# Patient Record
Sex: FEMALE | Race: WHITE | Employment: OTHER | URBAN - METROPOLITAN AREA
[De-identification: names, ages, dates, MRNs, and addresses within clinical notes are randomized per-mention and may not be internally consistent; named-entity substitution may affect disease eponyms.]

---

## 2017-01-09 PROBLEM — D70.9 NEUTROPENIC FEVER (HCC): Status: ACTIVE | Noted: 2017-01-09

## 2017-01-09 PROBLEM — R50.81 NEUTROPENIC FEVER (HCC): Status: ACTIVE | Noted: 2017-01-09

## 2017-04-25 ENCOUNTER — HOSPITAL ENCOUNTER (OUTPATIENT)
Dept: OTHER | Age: 77
Discharge: HOME | End: 2017-04-25
Attending: ORTHOPAEDIC SURGERY | Admitting: ORTHOPAEDIC SURGERY

## 2017-04-25 LAB
ALBUMIN SERPL-MCNC: 3.6 G/DL (ref 3.4–5)
ANION GAP SERPL CALCULATED.3IONS-SCNC: 15 MMOL/L (ref 3–16)
APTT: 27.5 SEC (ref 21–31.8)
BASOPHILS ABSOLUTE: 0 K/UL (ref 0–0.2)
BASOPHILS RELATIVE PERCENT: 0.7 %
BILIRUBIN URINE: NEGATIVE
BLOOD, URINE: NEGATIVE
BUN BLDV-MCNC: 11 MG/DL (ref 7–20)
CALCIUM SERPL-MCNC: 8.9 MG/DL (ref 8.3–10.6)
CHLORIDE BLD-SCNC: 100 MMOL/L (ref 99–110)
CLARITY: CLEAR
CO2: 25 MMOL/L (ref 21–32)
COLOR: YELLOW
CREAT SERPL-MCNC: 0.9 MG/DL (ref 0.6–1.2)
EOSINOPHILS ABSOLUTE: 1.2 K/UL (ref 0–0.6)
EOSINOPHILS RELATIVE PERCENT: 26.3 %
EPITHELIAL CELLS, UA: ABNORMAL /HPF
GFR AFRICAN AMERICAN: >60
GFR NON-AFRICAN AMERICAN: >60
GLUCOSE BLD-MCNC: 99 MG/DL (ref 70–99)
GLUCOSE URINE: NEGATIVE MG/DL
HCT VFR BLD CALC: 37.8 % (ref 36–48)
HEMOGLOBIN: 12.5 G/DL (ref 12–16)
INR BLD: 1.1 (ref 0.85–1.15)
KETONES, URINE: NEGATIVE MG/DL
LEUKOCYTE ESTERASE, URINE: ABNORMAL
LYMPHOCYTES ABSOLUTE: 0.8 K/UL (ref 1–5.1)
LYMPHOCYTES RELATIVE PERCENT: 16.4 %
MCH RBC QN AUTO: 33.5 PG (ref 26–34)
MCHC RBC AUTO-ENTMCNC: 33 G/DL (ref 31–36)
MCV RBC AUTO: 101.4 FL (ref 80–100)
MICROSCOPIC EXAMINATION: YES
MONOCYTES ABSOLUTE: 0.4 K/UL (ref 0–1.3)
MONOCYTES RELATIVE PERCENT: 8.6 %
NEUTROPHILS ABSOLUTE: 2.2 K/UL (ref 1.7–7.7)
NEUTROPHILS RELATIVE PERCENT: 48 %
NITRITE, URINE: NEGATIVE
PDW BLD-RTO: 13.6 % (ref 12.4–15.4)
PH UA: 6.5
PLATELET # BLD: 83 K/UL (ref 135–450)
PMV BLD AUTO: 10.2 FL (ref 5–10.5)
POTASSIUM SERPL-SCNC: 4.2 MMOL/L (ref 3.5–5.1)
PROTEIN UA: NEGATIVE MG/DL
PROTHROMBIN TIME: 12.4 SEC (ref 9.6–13)
RBC # BLD: 3.72 M/UL (ref 4–5.2)
RBC UA: ABNORMAL /HPF (ref 0–2)
SODIUM BLD-SCNC: 140 MMOL/L (ref 136–145)
SPECIFIC GRAVITY UA: 1.02
URINE TYPE: ABNORMAL
UROBILINOGEN, URINE: 0.2 E.U./DL
WBC # BLD: 4.7 K/UL (ref 4–11)
WBC UA: ABNORMAL /HPF (ref 0–5)

## 2017-04-26 LAB
ESTIMATED AVERAGE GLUCOSE: 108.3 MG/DL
HBA1C MFR BLD: 5.4 %
URINE CULTURE, ROUTINE: NORMAL

## 2017-05-11 PROBLEM — I10 HTN (HYPERTENSION): Status: ACTIVE | Noted: 2017-05-11

## 2017-05-11 PROBLEM — M25.551 RIGHT HIP PAIN: Status: ACTIVE | Noted: 2017-05-11

## 2017-05-11 PROBLEM — D69.6 THROMBOCYTOPENIA (HCC): Status: ACTIVE | Noted: 2017-05-11

## 2017-05-11 PROBLEM — E78.5 HLD (HYPERLIPIDEMIA): Status: ACTIVE | Noted: 2017-05-11

## 2017-05-11 PROBLEM — E11.9 DM2 (DIABETES MELLITUS, TYPE 2) (HCC): Status: ACTIVE | Noted: 2017-05-11

## 2017-05-12 PROBLEM — E66.9 OBESITY: Status: ACTIVE | Noted: 2017-05-12

## 2017-05-25 DIAGNOSIS — M25.551 RIGHT HIP PAIN: Primary | ICD-10-CM

## 2017-05-25 PROCEDURE — 73502 X-RAY EXAM HIP UNI 2-3 VIEWS: CPT | Performed by: ORTHOPAEDIC SURGERY

## 2017-06-01 ENCOUNTER — HOSPITAL ENCOUNTER (OUTPATIENT)
Dept: CT IMAGING | Age: 77
Discharge: HOME | End: 2017-06-01
Attending: NURSE PRACTITIONER | Admitting: NURSE PRACTITIONER

## 2017-06-01 DIAGNOSIS — C83.35 DIFFUSE LARGE B-CELL LYMPHOMA OF LYMPH NODES OF LOWER EXTREMITY (HCC): ICD-10-CM

## 2017-06-01 DIAGNOSIS — Z09 CHEMOTHERAPY FOLLOW-UP EXAMINATION: ICD-10-CM

## 2017-06-01 DIAGNOSIS — Z09 ENCOUNTER FOR FOLLOW-UP EXAMINATION AFTER COMPLETED TREATMENT FOR CONDITIONS OTHER THAN MALIGNANT NEOPLASM: ICD-10-CM

## 2017-06-22 DIAGNOSIS — M25.551 RIGHT HIP PAIN: Primary | ICD-10-CM

## 2017-06-22 PROCEDURE — 73502 X-RAY EXAM HIP UNI 2-3 VIEWS: CPT | Performed by: ORTHOPAEDIC SURGERY

## 2017-08-03 DIAGNOSIS — M25.551 RIGHT HIP PAIN: Primary | ICD-10-CM

## 2017-08-03 PROCEDURE — 73502 X-RAY EXAM HIP UNI 2-3 VIEWS: CPT | Performed by: ORTHOPAEDIC SURGERY

## 2017-11-09 DIAGNOSIS — M25.551 RIGHT HIP PAIN: Primary | ICD-10-CM

## 2018-05-17 DIAGNOSIS — R52 PAIN: Primary | ICD-10-CM

## 2018-05-17 DIAGNOSIS — M89.8X5 PAIN OF LEFT FEMUR: Primary | ICD-10-CM

## 2018-06-29 ENCOUNTER — OFFICE VISIT (OUTPATIENT)
Dept: ORTHOPEDIC SURGERY | Age: 78
End: 2018-06-29

## 2018-06-29 DIAGNOSIS — M54.16 LUMBAR RADICULOPATHY: Primary | ICD-10-CM

## 2018-06-29 DIAGNOSIS — G60.9 IDIOPATHIC PERIPHERAL NEUROPATHY: ICD-10-CM

## 2018-06-29 DIAGNOSIS — M54.31 BILATERAL SCIATICA: ICD-10-CM

## 2018-06-29 DIAGNOSIS — M54.32 BILATERAL SCIATICA: ICD-10-CM

## 2018-06-29 PROCEDURE — 95886 MUSC TEST DONE W/N TEST COMP: CPT | Performed by: PHYSICAL MEDICINE & REHABILITATION

## 2018-06-29 PROCEDURE — 95910 NRV CNDJ TEST 7-8 STUDIES: CPT | Performed by: PHYSICAL MEDICINE & REHABILITATION

## 2018-08-24 ENCOUNTER — OFFICE VISIT (OUTPATIENT)
Dept: ORTHOPEDIC SURGERY | Age: 78
End: 2018-08-24

## 2018-08-24 VITALS
HEART RATE: 62 BPM | BODY MASS INDEX: 29.89 KG/M2 | HEIGHT: 66 IN | WEIGHT: 186 LBS | SYSTOLIC BLOOD PRESSURE: 102 MMHG | DIASTOLIC BLOOD PRESSURE: 55 MMHG

## 2018-08-24 DIAGNOSIS — M54.16 LUMBAR RADICULOPATHY: Primary | ICD-10-CM

## 2018-08-24 PROCEDURE — 1101F PT FALLS ASSESS-DOCD LE1/YR: CPT | Performed by: PHYSICAL MEDICINE & REHABILITATION

## 2018-08-24 PROCEDURE — G8417 CALC BMI ABV UP PARAM F/U: HCPCS | Performed by: PHYSICAL MEDICINE & REHABILITATION

## 2018-08-24 PROCEDURE — 99214 OFFICE O/P EST MOD 30 MIN: CPT | Performed by: PHYSICAL MEDICINE & REHABILITATION

## 2018-08-24 PROCEDURE — 1123F ACP DISCUSS/DSCN MKR DOCD: CPT | Performed by: PHYSICAL MEDICINE & REHABILITATION

## 2018-08-24 PROCEDURE — 1036F TOBACCO NON-USER: CPT | Performed by: PHYSICAL MEDICINE & REHABILITATION

## 2018-08-24 PROCEDURE — G8400 PT W/DXA NO RESULTS DOC: HCPCS | Performed by: PHYSICAL MEDICINE & REHABILITATION

## 2018-08-24 PROCEDURE — 1090F PRES/ABSN URINE INCON ASSESS: CPT | Performed by: PHYSICAL MEDICINE & REHABILITATION

## 2018-08-24 PROCEDURE — 4040F PNEUMOC VAC/ADMIN/RCVD: CPT | Performed by: PHYSICAL MEDICINE & REHABILITATION

## 2018-08-24 PROCEDURE — G8427 DOCREV CUR MEDS BY ELIG CLIN: HCPCS | Performed by: PHYSICAL MEDICINE & REHABILITATION

## 2018-08-24 NOTE — PROGRESS NOTES
· Range of Motion:  Flex to 30° extend to 20°  · Strength:   Strength testing is 5/5 in all muscle groups tested. Except 1/5 right ankle dorsiflexion  · Special Tests:   Straight leg raise and crossed SLR negative. Leg length and pelvis level.  0 out of 5 Doug's signs. · Skin: There are no rashes, ulcerations or lesions. · Reflexes: Reflexes are symmetrically trace at the patellar and ankle tendons. Clonus absent bilaterally at the feet. · Gait & station: Normal gait  · Additional Examinations:   · RIGHT LOWER EXTREMITY: Inspection/examination of the right lower extremity does not show any tenderness, deformity or injury. Range of motion is full. There is no gross instability. There are no rashes, ulcerations or lesions. Strength and tone are normal.  ·   · LEFT LOWER EXTREMITY:  Inspection/examination of the left lower extremity does not show any tenderness, deformity or injury. Range of motion is full. There is no gross instability. There are no rashes, ulcerations or lesions.   Strength and tone are normal.    Diagnostic Testin/28/2017  5:48 PM - Shar, [facility] Incoming Lab Results From [facility]     Component Results     Component Value Ref Range & Units Status Collected Lab   Alb 3.8  3.5 - 5.0 g/dL Final 2017  2:07    Alk Phosphatase 159   38 - 126 U/L Final 2017  2:07    ALT 61   9 - 52 U/L Final 2017  2:07    AST 54   14 - 36 U/L Final 2017  2:07    Total Bilirubin 0.9  0.2 - 1.3 mg/dL Final 2017  2:07    BUN 16  7 - 17 mg/dL Final 2017  2:07    BUN/Creatinine Ratio 20.8  <25.0 Final 2017  2:07    Calcium 9.2  8.4 - 10.2 mg/dL Final 2017  2:07    Chloride 100  98 - 107 mmol/L Final 2017  2:07    CREATININE 0.77  0.52 - 1.04 mg/dL Final 2017  2:07    GFR Non- >60.0  >60.0 mL/min/1.73m Final 2017  2:07    eGFR  >60.0  >60.0

## 2018-11-13 DIAGNOSIS — M79.604 RIGHT LEG PAIN: Primary | ICD-10-CM

## 2019-06-03 ENCOUNTER — OFFICE VISIT (OUTPATIENT)
Dept: ORTHOPEDIC SURGERY | Age: 79
End: 2019-06-03
Payer: MEDICARE

## 2019-06-03 VITALS
WEIGHT: 186.07 LBS | SYSTOLIC BLOOD PRESSURE: 114 MMHG | HEART RATE: 74 BPM | BODY MASS INDEX: 29.9 KG/M2 | HEIGHT: 66 IN | DIASTOLIC BLOOD PRESSURE: 57 MMHG

## 2019-06-03 DIAGNOSIS — R52 PAIN: Primary | ICD-10-CM

## 2019-06-03 DIAGNOSIS — M70.62 GREATER TROCHANTERIC BURSITIS OF LEFT HIP: ICD-10-CM

## 2019-06-03 DIAGNOSIS — M17.12 PRIMARY OSTEOARTHRITIS OF LEFT KNEE: ICD-10-CM

## 2019-06-03 PROCEDURE — G8427 DOCREV CUR MEDS BY ELIG CLIN: HCPCS | Performed by: ORTHOPAEDIC SURGERY

## 2019-06-03 PROCEDURE — 1090F PRES/ABSN URINE INCON ASSESS: CPT | Performed by: ORTHOPAEDIC SURGERY

## 2019-06-03 PROCEDURE — 20611 DRAIN/INJ JOINT/BURSA W/US: CPT | Performed by: ORTHOPAEDIC SURGERY

## 2019-06-03 PROCEDURE — 1123F ACP DISCUSS/DSCN MKR DOCD: CPT | Performed by: ORTHOPAEDIC SURGERY

## 2019-06-03 PROCEDURE — 99203 OFFICE O/P NEW LOW 30 MIN: CPT | Performed by: ORTHOPAEDIC SURGERY

## 2019-06-03 PROCEDURE — 1036F TOBACCO NON-USER: CPT | Performed by: ORTHOPAEDIC SURGERY

## 2019-06-03 PROCEDURE — G8417 CALC BMI ABV UP PARAM F/U: HCPCS | Performed by: ORTHOPAEDIC SURGERY

## 2019-06-03 PROCEDURE — 4040F PNEUMOC VAC/ADMIN/RCVD: CPT | Performed by: ORTHOPAEDIC SURGERY

## 2019-06-03 PROCEDURE — G8400 PT W/DXA NO RESULTS DOC: HCPCS | Performed by: ORTHOPAEDIC SURGERY

## 2019-06-03 NOTE — PROGRESS NOTES
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    History of blood transfusion 01/2017    2 UNITS    Hyperlipidemia     Hypertension     Melanoma ([address]     right face    RAY (nonalcoholic steatohepatitis)     Venous insufficiency     RLE      Past Surgical History:     Past Surgical History:   Procedure Laterality Date    COLONOSCOPY      ENDOSCOPY, COLON, DIAGNOSTIC      EYE SURGERY Bilateral     TEAR DUCT RE-ROUTED    HIP SURGERY Right 05/11/2017    R total hip revision     HYSTERECTOMY      JESSICA BSO    INCONTINENCE SURGERY      JOINT REPLACEMENT Bilateral     hips, RIGHT X2, LEFT X1    KNEE ARTHROSCOPY Left     LEEP  1996    LIVER BIOPSY      NASAL SINUS SURGERY      OTHER SURGICAL HISTORY Left 09/13/2016    biopsy left ankle mass    OTHER SURGICAL HISTORY Left 10/05/2016    PORT-A-CATH PLACEMENT                     REVISION TOTAL HIP ARTHROPLASTY Right     SKIN BIOPSY      melanoma    TOTAL HIP ARTHROPLASTY Right     TOTAL HIP ARTHROPLASTY Left      Current Medications:     Current Outpatient Medications:     b complex vitamins capsule, Take 1 capsule by mouth daily, Disp: , Rfl:     lisinopril-hydrochlorothiazide (PRINZIDE;ZESTORETIC) 10-12.5 MG per tablet, Take 1 tablet by mouth daily, Disp: , Rfl:     calcium-vitamin D (OSCAL-500) 500-200 MG-UNIT per tablet, Take 1 tablet by mouth daily, Disp: , Rfl:     Multiple Vitamins-Minerals (THERAPEUTIC MULTIVITAMIN-MINERALS) tablet, Take 1 tablet by mouth daily, Disp: , Rfl:     ascorbic acid (VITAMIN C) 500 MG tablet, Take 500 mg by mouth daily, Disp: , Rfl:   Allergies: Other  Social History:    reports that she has never smoked. She has never used smokeless tobacco. She reports that she does not drink alcohol or use drugs. Family History:   Family History   Problem Relation Age of Onset   Benita Hodgson Cancer Mother 62        leukemia    Tuberculosis Father     Cancer Brother 72        lung    Cancer Brother 36        lung    Cancer Sister 36        Breast    Cancer Brother 62        prostate    Cancer Brother         lung    Cancer Brother         Prostate Cancet       REVIEW OF SYSTEMS:   For new problems, a full review of systems will be found scanned in the patient's chart. CONSTITUTIONAL: Denies unexplained weight loss, fevers, chills   NEUROLOGICAL: Denies unsteady gait or progressive weakness  SKIN: Denies skin changes, delayed healing, rash, itching       PHYSICAL EXAM:    Vitals: Blood pressure (!) 114/57, pulse 74, height 5' 5.98\" (1.676 m), weight 186 lb 1.1 oz (84.4 kg). GENERAL EXAM:  · General Apparence: Patient is adequately groomed with no evidence of malnutrition. · Orientation: The patient is oriented to time, place and person. · Mood & Affect:The patient's mood and affect are appropriate       LEFT hip and LEFT knee PHYSICAL EXAMINATION:  · Inspection:  Trace swelling of the LEFT knee, well-healed arthroscopy portals consistent with surgical history. She has some lower extremity swelling at baseline.   Well-healed surgical incision involving the LEFT hip    · Palpation:  Knee is tender through the medial aspect in particular, LEFT hip is tender at the lateral aspect over the greater trochanter, no groin tenderness      · Range of Motion: Range of motion today at the knee is 0-110°, Gen. internal and external rotation of the LEFT hip is tolerated today no groin pain    · Strength: Hip flexor hip abductor and adductor are intact, extensor mechanism intact at the knee    · Special Tests:  Ligamentously stable regarding the LEFT knee            · Skin:  There are no rashes, ulcerations or lesions. · There are no distal  dysvascular changes     Gait & station: She ambulates today with a slightly antalgic gait pattern      Additional Examinations:              Diagnostic Testing: The following x rays were read and interpreted by myself      1.  3 x-ray views of the LEFT knee +2 x-ray views of the LEFT hip were obtained today. The patient has significant tricompartmental osteoarthritis of the LEFT knee near bone-on-bone medial compartment. No evidence of acute abnormalities. 2x-rays Left hip demonstrate bilateral total hip replacements, mild eccentric wear of the LEFT hip but prosthesis appears stable  Hip replacements are both S-ROM's. Orders     Orders Placed This Encounter   Procedures    XR HIP LEFT (2-3 VIEWS)     Standing Status:   Future     Number of Occurrences:   1     Standing Expiration Date:   7/3/2019    XR KNEE LEFT (3 VIEWS)     Standing Status:   Future     Number of Occurrences:   1     Standing Expiration Date:   7/3/2019         Assessment / Treatment Plan:     1. LEFT knee osteoarthritis    2. Left hip trochanteric bursitis  The patient is interested in injections today to both the LEFT knee and her LEFT hip. She will follow-up in 3-4 weeks as needed. I also recommended some physical therapy for her LEFT hip bursitis    I discussed in detail the risks, benefits, and complications of an injection which include but are not limited to infection, skin reactions, hot swollen joints, and anaphylaxis with the patient. The patient verbalized good understanding and gave informed consent for the injection. The skin was prepped using sterile alcohol.  A sterile 22-gauge needle was inserted into the area of maximal tenderness over the LEFT  greater trochanter and a mixture of 4 mL of 2% Carbocaine, 4 mL of 0.25% Marcaine, and 80 mg of Depo-Medrol was injected under sterile technique. The needle was withdrawn and the puncture site sealed with a Band-Aid. Technique: Under sterile conditions a SonoSite ultrasound unit with a variable frequency (6.0-15.0 MHz) linear transducer was used to localize the placement of a 22-gauge needle into the area of maximal tenderness over the greater trochanter. Findings: Successful needle placement for Hip injection. Final images were taken and saved for permanent record. The patient tolerated the injection well. The patient was instructed to call the office immediately if there is any pain, redness, warmth, fever, or chills. I discussed in detail the risks, benefits and complications of an injection which included but are not limited to infection, skin reactions, hot swollen joint, and anaphylaxis with the patient. The patient verbalized understanding and gave informed consent for the injection. The patient's knee was flexed to 90° and the skin prepped using sterile alcohol solution. A sterile 22-gauge needle was inserted into the LEFT knee and the mixture of 4 mL of 2% Carbocaine, 4 mL of 0.25% Marcaine, and 2mL of 40 mg of Depo-Medrol was injected under sterile technique. The needle was withdrawn and the puncture site sealed with a Band-Aid. Technique: Under sterile conditions a SonoSite ultrasound unit with a variable frequency (6.0-15.0 MHz) linear transducer was used to localize the placement of a 22-gauge needle into the knee joint. Findings: Successful needle placement for knee injection. Final images were taken and saved for permanent record. The patient tolerated the injection well. T`he patient was instructed to call the office immediately if there is any pain, redness, warmth, fever, or chills. I have personally performed and/or participated in the history, exam and medical decision making and agree with all pertinent clinical information.  I have also reviewed

## 2019-06-03 NOTE — PROGRESS NOTES
Bernard Peña  XZP#[id number]  LOT#- [id number]  UCL:26/0685    4CC XYLOCAINE  WSW#[id number]  EOY#[id number]  EXP: 10/2022    2CC DEPO  NDC#-[id number]  [facility]#-[id number]  EXP: 01/2021    SITE: LEFT HIP & LEFT KNEE

## 2019-06-10 ENCOUNTER — OFFICE VISIT (OUTPATIENT)
Dept: ORTHOPEDIC SURGERY | Age: 79
End: 2019-06-10
Payer: MEDICARE

## 2019-06-10 VITALS
DIASTOLIC BLOOD PRESSURE: 69 MMHG | WEIGHT: 186.07 LBS | BODY MASS INDEX: 29.9 KG/M2 | HEART RATE: 65 BPM | HEIGHT: 66 IN | SYSTOLIC BLOOD PRESSURE: 147 MMHG

## 2019-06-10 DIAGNOSIS — M51.36 DDD (DEGENERATIVE DISC DISEASE), LUMBAR: ICD-10-CM

## 2019-06-10 DIAGNOSIS — M54.5 LOW BACK PAIN, UNSPECIFIED BACK PAIN LATERALITY, UNSPECIFIED CHRONICITY, WITH SCIATICA PRESENCE UNSPECIFIED: Primary | ICD-10-CM

## 2019-06-10 DIAGNOSIS — M54.16 LUMBAR RADICULITIS: ICD-10-CM

## 2019-06-10 PROCEDURE — G8417 CALC BMI ABV UP PARAM F/U: HCPCS | Performed by: PHYSICIAN ASSISTANT

## 2019-06-10 PROCEDURE — G8400 PT W/DXA NO RESULTS DOC: HCPCS | Performed by: PHYSICIAN ASSISTANT

## 2019-06-10 PROCEDURE — 99203 OFFICE O/P NEW LOW 30 MIN: CPT | Performed by: PHYSICIAN ASSISTANT

## 2019-06-10 PROCEDURE — 1123F ACP DISCUSS/DSCN MKR DOCD: CPT | Performed by: PHYSICIAN ASSISTANT

## 2019-06-10 PROCEDURE — 4040F PNEUMOC VAC/ADMIN/RCVD: CPT | Performed by: PHYSICIAN ASSISTANT

## 2019-06-10 PROCEDURE — 1090F PRES/ABSN URINE INCON ASSESS: CPT | Performed by: PHYSICIAN ASSISTANT

## 2019-06-10 PROCEDURE — G8427 DOCREV CUR MEDS BY ELIG CLIN: HCPCS | Performed by: PHYSICIAN ASSISTANT

## 2019-06-10 PROCEDURE — 1036F TOBACCO NON-USER: CPT | Performed by: PHYSICIAN ASSISTANT

## 2019-06-10 RX ORDER — METHYLPREDNISOLONE 4 MG/1
TABLET ORAL
Qty: 1 KIT | Refills: 0 | Status: SHIPPED | OUTPATIENT
Start: 2019-06-10 | End: 2020-01-03 | Stop reason: ALTCHOICE

## 2019-06-10 NOTE — PROGRESS NOTES
New Patient: SPINE    CHIEF COMPLAINT:    Chief Complaint   Patient presents with    Back Pain     Pain goes down left leg. HISTORY OF PRESENT ILLNESS:                The patient is a 69 y.o. female history of melanoma, non-Hodgkin's lymphoma, chronic right foot drop following multiple right hip surgeries and revision referred by Dr. Yue Peñaloza for left low back and leg pain. She reports a one year history of atraumatic chronic aching left low back pain and a 6-month history of pain extending into the left lateral thigh/calf with tingling. She also reports leg \"jumping\" which occurs at nighttime. As stated above she is a right chronic foot drop following multiple right hip surgeries which is resulted in antalgic gait. Her symptoms are increased with walking and standing. Relief with sitting resting and heat. Conservative care includes NSAIDs, Tylenol, left GTB injection with some improvement. Denies any new or progressive weakness. No recent bowel or bladder changes. No recent trauma. No upper extremity symptoms. Pain Assessment  Location of Pain: Back  Severity of Pain: 5  Quality of Pain: Sharp, Dull, Aching  Duration of Pain: Persistent  Frequency of Pain: Constant  Aggravating Factors: Stairs, Standing, Walking, Squatting, Straightening, Kneeling, Exercise, Stretching, Bending  Limiting Behavior: Yes  Relieving Factors: Rest  Result of Injury: No  Work-Related Injury: No  Are there other pain locations you wish to document?: No    The pain assessment was noted & reviewed in the medical record today.      Current/Past Treatment:   · Physical Therapy: past  · Chiropractic:   no  · Injection:   no  Medications:            NSAIDS: OCT            Muscle relaxer:              Steriods:              Neuropathic medications:              Opioids:            Other:   · Surgery/Consult: no    Work Status/Functionality: retired    Past Medical History: Medical history form was reviewed today & scanned into the Allergies: Other  Social History:    reports that she has never smoked. She has never used smokeless tobacco. She reports that she does not drink alcohol or use drugs. Family History:   Family History   Problem Relation Age of Onset   [de-identified] Cancer Mother 62        leukemia    Tuberculosis Father     Cancer Brother 72        lung    Cancer Brother 36        lung    Cancer Sister 36        Breast    Cancer Brother 62        prostate    Cancer Brother         lung    Cancer Brother         Prostate Cancet       REVIEW OF SYSTEMS: Full ROS noted & scanned   CONSTITUTIONAL: Denies unexplained weight loss, fevers, chills or fatigue  NEUROLOGICAL: Denies unsteady gait or progressive weakness  MUSCULOSKELETAL: admits joint swelling or redness  PSYCHOLOGICAL: Denies anxiety, depression   SKIN: Denies skin changes, delayed healing, rash, itching   HEMATOLOGIC: Denies easy bleeding admits easy bruising  ENDOCRINE: Denies excessive thirst, urination, heat/cold  RESPIRATORY: Denies current dyspnea, cough  GI: Denies nausea, vomiting, diarrhea   : Denies bowel or bladder issues       PHYSICAL EXAM:    Vitals: Blood pressure (!) 147/69, pulse 65, height 5' 5.98\" (1.676 m), weight 186 lb 1.1 oz (84.4 kg). GENERAL EXAM:  · General Apparence: Patient is adequately groomed with no evidence of malnutrition. · Orientation: The patient is oriented to time, place and person. · Mood & Affect:The patient's mood and affect are appropriate   · Vascular: Examination reveals no swelling tenderness in upper or lower extremities. Good capillary refill  · Lymphatic: The lymphatic examination bilaterally reveals all areas to be without enlargement or induration  · Sensation: Sensation is intact without deficit    LUMBAR/SACRAL EXAMINATION:  · Inspection: Local inspection shows no step-off or bruising. Lumbar alignment is normal.  Sagittal and Coronal balance is neutral.      · Palpation:   No evidence of tenderness at the midline. ADF weakness following multiple right hip sx  5) H/o lymphoma, melanoma       Plan:   1) PT for above, gait training  2) She has an AFO but opts not to wear it.  We did discuss her antalgic gait is likely contributing to her current symptoms  3) MDP  4) Declining left LE EMG  5) F/u 4-6wks for re-evaluation         [facility]

## 2019-12-19 ENCOUNTER — TELEPHONE (OUTPATIENT)
Dept: SURGERY | Age: 79
End: 2019-12-19

## 2019-12-31 NOTE — PROGRESS NOTES
[facility] [employer] / [facility] ([facility]) [address] [facility], [address]    Acknowledgment of Informed Consent for Surgical or Medical Procedure and Sedation  I agree to allow doctor(s) JESSICA KHAN and his/her associates or assistants, including residents and/or other qualified medical practitioner to perform the following medical treatment or procedure and to administer or direct the administration of sedation as necessary:  Procedure(s): [address]  My doctor has explained the following regarding the proposed procedure:   the explanation of the procedure   the benefits of the procedure   the potential problems that might occur during recuperation   the risks and side effects of the procedure which could include but are not limited to severe blood loss, infection, stroke or death   the benefits, risks and side effect of alternative procedures including the consequences of declining this procedure or any alternative procedures   the likelihood of achieving satisfactory results. I acknowledge no guarantee or assurance has been made to me regarding the results. I understand that during the course of this treatment/procedure, unforeseen conditions can occur which require an additional or different procedure. I agree to allow my physician or assistants to perform such extension of the original procedure as they may find necessary. I understand that sedation will often result in temporary impairment of memory and fine motor skills and that sedation can occasionally progress to a state of deep sedation or general anesthesia. I understand the risks of anesthesia for surgery include, but are not limited to, sore throat, hoarseness, injury to face, mouth, or teeth; nausea; headache; injury to blood vessels or nerves; death, brain damage, or paralysis.     I understand that if I have a Limitation of Treatment order in effect during my hospitalization, the order may or may not be in effect

## 2020-01-03 RX ORDER — ATORVASTATIN CALCIUM 10 MG/1
10 TABLET, FILM COATED ORAL DAILY
COMMUNITY

## 2020-01-03 RX ORDER — VITAMIN E 268 MG
400 CAPSULE ORAL 2 TIMES DAILY
COMMUNITY

## 2020-01-03 NOTE — PROGRESS NOTES
registered at your bedside once brought back to your room. 5. DO NOT EAT ANYTHING eight hours prior to surgery. May have 8 ounces of water 4 hours prior to surgery. 6. MEDICATIONS    Take the following medications with a SMALL sip of water: HOLD VIT E   Use your usual dose of inhalers the morning of surgery. BRING your rescue inhaler with you to hospital.    Anesthesia does NOT want you to take insulin the morning of surgery. They will control your blood sugar while you are at the hospital. Please contact your ordering physician for instructions regarding your insulin the night before your procedure. If you have an insulin pump, please keep it set on basal rate. 7. Do not swallow water when brushing teeth. No gum, candy, mints or ice chips. Refrain from smoking or at least decrease the amount. 8. Dress in loose, comfortable clothing appropriate for redressing after your procedure. Do not wear jewelry (including body piercings), make-up (especially NO eye make-up), fingernail polish (NO toenail polish if foot/leg surgery), lotion, powders or metal hairclips. 9. Dentures, glasses, or contacts will need to be removed before your procedure. Bring cases for your glasses, contacts, dentures, or hearing aids to protect them while you are in surgery. 10. If you use a CPAP, please bring it with you on the day of your procedure. 11. We recommend that valuable personal  belongings such as cash, cell phones, e-tablets or jewelry, be left at home during your stay. The hospital will not be responsible for valuables that are not secured in the hospital safe. However, if your insurance requires a co-pay, you may want to bring a method of payment, i.e. Check or credit card, if you wish to pay your co-pay the day of surgery. 12. If you are to stay overnight, you may bring a bag with personal items.  Please have any large items you may need brought in by your family after your arrival to your hospital room.    13. If you have a Living Will or Durable Power of , please bring a copy on the day of your procedure. 15. With your permission, one family member may accompany you while you are being prepared for surgery. Once you are ready, additional family members may join you. HOW WE KEEP YOU SAFE and WORK TO PREVENT SURGICAL SITE INFECTIONS:  1. Health care workers should always check your ID bracelet to verify your name and birth date. You will be asked many times to state your name, date of birth, and allergies. 2. Health care workers should always clean their hands with soap or alcohol gel before providing care to you. It is okay to ask anyone if they cleaned their hands before they touch you. 3. You will be actively involved in verifying the type of procedure you are having and ensuring the correct surgical site. This will be confirmed multiple times prior to your procedure. Do NOT sterling your surgery site UNLESS instructed to by your surgeon. 4. Do not shave or wax for 72 hours prior to procedure near your operative site. Shaving with a razor can irritate your skin and make it easier to develop an infection. On the day of your procedure, any hair that needs to be removed near the surgical site will be clipped by a healthcare worker using a special clippers designed to avoid skin irritation. 5. When you are in the operating room, your surgical site will be cleansed with a special soap, and in most cases, you will be given an antibiotic before the surgery begins. What to expect AFTER YOUR PROCEDURE:  1. Immediately following your procedure, your will be taken to the PACU for the first phase of your recovery. Your nurse will help you recover from any potential side effects of anesthesia, such as extreme drowsiness, changes in your vital signs or breathing patterns. Nausea, headache, muscle aches, or sore throat may also occur after anesthesia.   Your nurse will help you manage these potential side effects. 2. For comfort and safety, arrange to have someone at home with you for the first 24 hours after discharge. 3. You and your family will be given written instructions about your diet, activity, dressing care, medications, and return visits. 4. Once at home, should issues with nausea, pain, or bleeding occur, or should you notice any signs of infection, you should call your surgeon. 5. Always clean your hands before and after caring for your wound. Do not let your family touch your surgery site without cleaning their hands. 6. Narcotic pain medications can cause significant constipation. You may want to add a stool softener to your postoperative medication schedule or speak to your surgeon on how best to manage this SIDE EFFECT. SPECIAL INSTRUCTIONS     Thank you for allowing us to care for you. We strive to exceed your expectations in the delivery of care and service provided to you and your family. If you need to contact us for any reason, please call us at [phone]    Instructions reviewed with patient during preadmission testing phone interview. Girma Gonzalez. 1/3/2020 .11:20 AM      ADDITIONAL EDUCATIONAL INFORMATION REVIEWED PER PHONE WITH YOU AND/OR YOUR FAMILY:    Yes Antibacterial Soap

## 2020-01-06 ENCOUNTER — ANESTHESIA EVENT (OUTPATIENT)
Dept: OPERATING ROOM | Age: 80
End: 2020-01-06
Payer: MEDICARE

## 2020-01-08 ENCOUNTER — HOSPITAL ENCOUNTER (OUTPATIENT)
Age: 80
Setting detail: OUTPATIENT SURGERY
Discharge: HOME | End: 2020-01-08
Attending: SURGERY | Admitting: SURGERY
Payer: MEDICARE

## 2020-01-08 ENCOUNTER — ANESTHESIA (OUTPATIENT)
Dept: OPERATING ROOM | Age: 80
End: 2020-01-08
Payer: MEDICARE

## 2020-01-08 VITALS — DIASTOLIC BLOOD PRESSURE: 44 MMHG | TEMPERATURE: 96.8 F | SYSTOLIC BLOOD PRESSURE: 89 MMHG | OXYGEN SATURATION: 96 %

## 2020-01-08 VITALS
TEMPERATURE: 97.7 F | DIASTOLIC BLOOD PRESSURE: 71 MMHG | WEIGHT: 206 LBS | HEIGHT: 65 IN | SYSTOLIC BLOOD PRESSURE: 138 MMHG | BODY MASS INDEX: 34.32 KG/M2 | HEART RATE: 60 BPM | RESPIRATION RATE: 16 BRPM | OXYGEN SATURATION: 97 %

## 2020-01-08 LAB
GLUCOSE BLD-MCNC: 100 MG/DL (ref 70–99)
PERFORMED ON: ABNORMAL

## 2020-01-08 PROCEDURE — 3600000002 HC SURGERY LEVEL 2 BASE: Performed by: SURGERY

## 2020-01-08 PROCEDURE — 7100000010 HC PHASE II RECOVERY - FIRST 15 MIN: Performed by: SURGERY

## 2020-01-08 PROCEDURE — 2580000003 HC RX 258: Performed by: SURGERY

## 2020-01-08 PROCEDURE — 7100000011 HC PHASE II RECOVERY - ADDTL 15 MIN: Performed by: SURGERY

## 2020-01-08 PROCEDURE — 6360000002 HC RX W HCPCS: Performed by: NURSE ANESTHETIST, CERTIFIED REGISTERED

## 2020-01-08 PROCEDURE — 3600000012 HC SURGERY LEVEL 2 ADDTL 15MIN: Performed by: SURGERY

## 2020-01-08 PROCEDURE — 2709999900 HC NON-CHARGEABLE SUPPLY: Performed by: SURGERY

## 2020-01-08 PROCEDURE — 7100000000 HC PACU RECOVERY - FIRST 15 MIN: Performed by: SURGERY

## 2020-01-08 PROCEDURE — 3700000001 HC ADD 15 MINUTES (ANESTHESIA): Performed by: SURGERY

## 2020-01-08 PROCEDURE — 2500000003 HC RX 250 WO HCPCS: Performed by: SURGERY

## 2020-01-08 PROCEDURE — 6360000002 HC RX W HCPCS: Performed by: SURGERY

## 2020-01-08 PROCEDURE — 3700000000 HC ANESTHESIA ATTENDED CARE: Performed by: SURGERY

## 2020-01-08 PROCEDURE — 7100000001 HC PACU RECOVERY - ADDTL 15 MIN: Performed by: SURGERY

## 2020-01-08 PROCEDURE — 36590 REMOVAL TUNNELED CV CATH: CPT | Performed by: SURGERY

## 2020-01-08 PROCEDURE — 2580000003 HC RX 258: Performed by: ANESTHESIOLOGY

## 2020-01-08 RX ORDER — PROPOFOL 10 MG/ML
INJECTION, EMULSION INTRAVENOUS CONTINUOUS PRN
Status: DISCONTINUED | OUTPATIENT
Start: 2020-01-08 | End: 2020-01-08 | Stop reason: SDUPTHER

## 2020-01-08 RX ORDER — HYDRALAZINE HYDROCHLORIDE 20 MG/ML
5 INJECTION INTRAMUSCULAR; INTRAVENOUS EVERY 10 MIN PRN
Status: DISCONTINUED | OUTPATIENT
Start: 2020-01-08 | End: 2020-01-08 | Stop reason: HOSPADM

## 2020-01-08 RX ORDER — LIDOCAINE HYDROCHLORIDE 10 MG/ML
INJECTION, SOLUTION INFILTRATION; PERINEURAL PRN
Status: DISCONTINUED | OUTPATIENT
Start: 2020-01-08 | End: 2020-01-08 | Stop reason: ALTCHOICE

## 2020-01-08 RX ORDER — FENTANYL CITRATE 50 UG/ML
INJECTION, SOLUTION INTRAMUSCULAR; INTRAVENOUS PRN
Status: DISCONTINUED | OUTPATIENT
Start: 2020-01-08 | End: 2020-01-08 | Stop reason: SDUPTHER

## 2020-01-08 RX ORDER — DIPHENHYDRAMINE HYDROCHLORIDE 50 MG/ML
12.5 INJECTION INTRAMUSCULAR; INTRAVENOUS
Status: DISCONTINUED | OUTPATIENT
Start: 2020-01-08 | End: 2020-01-08 | Stop reason: HOSPADM

## 2020-01-08 RX ORDER — 0.9 % SODIUM CHLORIDE 0.9 %
500 INTRAVENOUS SOLUTION INTRAVENOUS
Status: DISCONTINUED | OUTPATIENT
Start: 2020-01-08 | End: 2020-01-08 | Stop reason: HOSPADM

## 2020-01-08 RX ORDER — SODIUM CHLORIDE, SODIUM LACTATE, POTASSIUM CHLORIDE, CALCIUM CHLORIDE 600; 310; 30; 20 MG/100ML; MG/100ML; MG/100ML; MG/100ML
INJECTION, SOLUTION INTRAVENOUS CONTINUOUS
Status: DISCONTINUED | OUTPATIENT
Start: 2020-01-08 | End: 2020-01-08 | Stop reason: HOSPADM

## 2020-01-08 RX ORDER — HYDROCODONE BITARTRATE AND ACETAMINOPHEN 5; 325 MG/1; MG/1
1 TABLET ORAL
Status: DISCONTINUED | OUTPATIENT
Start: 2020-01-08 | End: 2020-01-08 | Stop reason: HOSPADM

## 2020-01-08 RX ORDER — PROPOFOL 10 MG/ML
INJECTION, EMULSION INTRAVENOUS PRN
Status: DISCONTINUED | OUTPATIENT
Start: 2020-01-08 | End: 2020-01-08 | Stop reason: SDUPTHER

## 2020-01-08 RX ORDER — MAGNESIUM HYDROXIDE 1200 MG/15ML
LIQUID ORAL CONTINUOUS PRN
Status: COMPLETED | OUTPATIENT
Start: 2020-01-08 | End: 2020-01-08

## 2020-01-08 RX ORDER — ONDANSETRON 2 MG/ML
4 INJECTION INTRAMUSCULAR; INTRAVENOUS
Status: DISCONTINUED | OUTPATIENT
Start: 2020-01-08 | End: 2020-01-08 | Stop reason: HOSPADM

## 2020-01-08 RX ORDER — MEPERIDINE HYDROCHLORIDE 25 MG/ML
12.5 INJECTION INTRAMUSCULAR; INTRAVENOUS; SUBCUTANEOUS EVERY 5 MIN PRN
Status: DISCONTINUED | OUTPATIENT
Start: 2020-01-08 | End: 2020-01-08 | Stop reason: HOSPADM

## 2020-01-08 RX ORDER — PROCHLORPERAZINE EDISYLATE 5 MG/ML
5 INJECTION INTRAMUSCULAR; INTRAVENOUS
Status: DISCONTINUED | OUTPATIENT
Start: 2020-01-08 | End: 2020-01-08 | Stop reason: HOSPADM

## 2020-01-08 RX ADMIN — SODIUM CHLORIDE, POTASSIUM CHLORIDE, SODIUM LACTATE AND CALCIUM CHLORIDE: 600; 310; 30; 20 INJECTION, SOLUTION INTRAVENOUS at 10:44

## 2020-01-08 RX ADMIN — PROPOFOL 30 MG: 10 INJECTION, EMULSION INTRAVENOUS at 12:09

## 2020-01-08 RX ADMIN — FENTANYL CITRATE 25 MCG: 50 INJECTION INTRAMUSCULAR; INTRAVENOUS at 12:19

## 2020-01-08 RX ADMIN — FENTANYL CITRATE 50 MCG: 50 INJECTION INTRAMUSCULAR; INTRAVENOUS at 12:03

## 2020-01-08 RX ADMIN — CEFAZOLIN 2 G: 330 INJECTION, POWDER, FOR SOLUTION INTRAMUSCULAR; INTRAVENOUS at 12:03

## 2020-01-08 RX ADMIN — PROPOFOL 75 MCG/KG/MIN: 10 INJECTION, EMULSION INTRAVENOUS at 12:09

## 2020-01-08 RX ADMIN — FENTANYL CITRATE 25 MCG: 50 INJECTION INTRAMUSCULAR; INTRAVENOUS at 12:22

## 2020-01-08 RX ADMIN — PROPOFOL 20 MG: 10 INJECTION, EMULSION INTRAVENOUS at 12:19

## 2020-01-08 RX ADMIN — PROPOFOL 30 MG: 10 INJECTION, EMULSION INTRAVENOUS at 12:14

## 2020-01-08 ASSESSMENT — PAIN SCALES - GENERAL
PAINLEVEL_OUTOF10: 0
PAINLEVEL_OUTOF10: 0

## 2020-01-08 ASSESSMENT — PULMONARY FUNCTION TESTS
PIF_VALUE: 1
PIF_VALUE: 0
PIF_VALUE: 0
PIF_VALUE: 1
PIF_VALUE: 0
PIF_VALUE: 1

## 2020-01-08 ASSESSMENT — PAIN - FUNCTIONAL ASSESSMENT: PAIN_FUNCTIONAL_ASSESSMENT: 0-10

## 2020-01-08 NOTE — PROGRESS NOTES
Ambulatory Surgery/Procedure Discharge Note    Vitals:    01/08/20 1354   BP: 138/71   Pulse: 60   Resp:    Temp: 97.7 °F (36.5 °C)   SpO2: 97%       In: 645 [P.O.:120; I.V.:525]  Out: 5     Restroom use offered before discharge. Yes/voided on way to car    Pain assessment:  none  Pain Level: 0    No swelling or bruising at left upper chest incision which is well approximated. . No drainage. She assisted with dressing. Taking juice and denies nausea. Discharge instructions reviewed. Patient and daughter educated, using the teach back method, about follow up instructions and discharge instructions. A completed copy of the AVS instructions given to patient and all questions answered. Patient discharged to home/self care.  Patient discharged via wheel chair by me to waiting family/S.O.       1/8/2020 2:28 PM

## 2020-01-08 NOTE — ANESTHESIA PRE PROCEDURE
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 8XXA    Neutropenic fever ([facility]) D70.9, R50.81    HTN (hypertension) I10    HLD (hyperlipidemia) E78.5    DM2 (diabetes mellitus, type 2) ([facility]) E11.9    Right hip pain M25.551    Thrombocytopenia ([facility]) D69.6    Obesity E66.9       Past Medical History:        Diagnosis Date    Arthritis     Cancer ([facility]     melanoma    Diffuse large cell non-Hodgkin's lymphoma ([facility]     Dislocation of hip ([address]     RIGHT X7    Environmental allergies     Foot drop, right 2010    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 hip surgery 2010    History of blood transfusion 01/2017    2 UNITS    Hyperlipidemia     Hypertension     Melanoma ([address]     right face    RAY (nonalcoholic steatohepatitis)     Venous insufficiency     RLE       Past Surgical History:        Procedure Laterality Date    CATARACT REMOVAL WITH IMPLANT Bilateral 12/2019    COLONOSCOPY      ENDOSCOPY, COLON, DIAGNOSTIC      EYE SURGERY Bilateral     TEAR DUCT RE-ROUTED    HIP SURGERY Right 05/11/2017    R total hip revision     HYSTERECTOMY      JESSICA BSO    INCONTINENCE SURGERY      JOINT REPLACEMENT Bilateral     hips, RIGHT X2, LEFT X1    KNEE ARTHROSCOPY Left     LEEP  1996    LIVER BIOPSY      0.9 07/28/2017    ALKPHOS 159 07/28/2017    ALKPHOS 73 01/12/2017    AST 54 07/28/2017    ALT 61 07/28/2017       POC Tests:   Recent Labs     01/08/20  1046   POCGLU 100*       Coags:   Lab Results   Component Value Date    PROTIME 12.4 04/25/2017    INR 1.10 04/25/2017    APTT 27.5 04/25/2017       HCG (If Applicable): No results found for: PREGTESTUR, PREGSERUM, HCG, HCGQUANT     ABGs: No results found for: PHART, PO2ART, LOJ3AKY, NFL0MXZ, BEART, Y0LYMGCA     Type & Screen (If Applicable):  No results found for: LABABO, [address]    Anesthesia Evaluation  Patient summary reviewed and Nursing notes reviewed no history of anesthetic complications:   Airway: Mallampati: II  TM distance: >3 FB   Neck ROM: full  Mouth opening: > = 3 FB Dental: normal exam         Pulmonary:Negative Pulmonary ROS breath sounds clear to auscultation                             Cardiovascular:  Exercise tolerance: good (>4 METS),   (+) hypertension (recently June 2019  off meds):,     (-) past MI    NYHA Classification: II    Rhythm: regular  Rate: normal           Beta Blocker:  Not on Beta Blocker      ROS comment: 2 + pedal edema  Can not tolerate diuretics     Neuro/Psych:   Negative Neuro/Psych ROS               ROS comment: Foot drop right  GI/Hepatic/Renal:   (+) liver disease (vu):,           Endo/Other:    (+) DiabetesType II DM, , malignancy/cancer (last chemo was in 2017   in remission  from lymphoma ). Abdominal:           Vascular: negative vascular ROS. Anesthesia Plan      MAC     ASA 3       Induction: intravenous. MIPS: Postoperative opioids intended and Prophylactic antiemetics administered. Anesthetic plan and risks discussed with patient and child/children. Plan discussed with CRNA.     Attending anesthesiologist reviewed and agrees with Pre Eval content              Harsha Sutton DO   1/8/2020

## 2020-01-08 NOTE — H&P
regular rate and rhythm, No murmur noted    Lungs:  No increased work of breathing, good air exchange, clear to auscultation bilaterally, no crackles or wheezing    Abdomen:  soft, non-distended, non-tender, no rebound tenderness or guarding, normal active bowel sounds and no masses palpated    ASSESSMENT AND PLAN:    1. Patient seen and focused exam done today- no new changes since last physical exam on 1/2/20    2. Access to ancillary services are available per request of the provider.     GRACE Felix - CNP     1/8/2020

## 2020-01-08 NOTE — PROGRESS NOTES
PACU Transfer to [facility]    Vitals:    01/08/20 1300   BP: (!) 129/58   Pulse: 55   Resp: 13   Temp: 97.4 °F (36.3 °C)   SpO2: 100%     BP WNL for Rakel    Intake/Output Summary (Last 24 hours) at 1/8/2020 1308  Last data filed at 1/8/2020 1227  Gross per 24 hour   Intake 450 ml   Output 5 ml   Net 445 ml       Pain assessment:  present - adequately treated  Pain Level: 0    Patient transferred to care of VELASQUEZ RN.    1/8/2020 1:08 PM

## 2021-12-29 ENCOUNTER — HOSPITAL ENCOUNTER (OUTPATIENT)
Dept: VASCULAR LAB | Age: 81
Discharge: HOME | End: 2021-12-29
Payer: MEDICARE

## 2021-12-29 DIAGNOSIS — R22.43 LOCALIZED SWELLING, MASS, OR LUMP OF LOWER EXTREMITY, BILATERAL: ICD-10-CM

## 2021-12-29 PROCEDURE — 93970 EXTREMITY STUDY: CPT

## 2022-09-01 ENCOUNTER — TELEPHONE (OUTPATIENT)
Dept: INTERVENTIONAL RADIOLOGY/VASCULAR | Age: 82
End: 2022-09-01

## 2022-09-01 NOTE — TELEPHONE ENCOUNTER
Called pt with Pre-procedure instructions for liver biopsy. Pt verified she has transportation arranged to and from hospital, will remain NPO after midnight, and pt is not on anticoagulant therapy. Reviewed basics of procedure with pt. Verbalized understanding - no further questions at this time.

## 2022-09-02 ENCOUNTER — HOSPITAL ENCOUNTER (OUTPATIENT)
Dept: ULTRASOUND IMAGING | Age: 82
Discharge: HOME | End: 2022-09-02
Payer: MEDICARE

## 2022-09-02 ENCOUNTER — HOSPITAL ENCOUNTER (OUTPATIENT)
Dept: CT IMAGING | Age: 82
Discharge: HOME | End: 2022-09-02
Payer: MEDICARE

## 2022-09-02 VITALS
OXYGEN SATURATION: 97 % | DIASTOLIC BLOOD PRESSURE: 98 MMHG | HEART RATE: 56 BPM | RESPIRATION RATE: 17 BRPM | TEMPERATURE: 97.3 F | SYSTOLIC BLOOD PRESSURE: 128 MMHG

## 2022-09-02 DIAGNOSIS — R16.0 LIVER MASS: ICD-10-CM

## 2022-09-02 DIAGNOSIS — C83.35 RETICULOSARCOMA OF LYMPH NODES OF INGUINAL REGION AND LOWER LIMB (HCC): ICD-10-CM

## 2022-09-02 LAB
APTT: 28.7 SEC (ref 23–34.3)
INR BLD: 1.15 (ref 0.87–1.14)
PLATELET # BLD: 69 K/UL (ref 135–450)
PROTHROMBIN TIME: 14.6 SEC (ref 11.7–14.5)

## 2022-09-02 PROCEDURE — 2709999900 US BIOPSY LIVER PERCUTANEOUS

## 2022-09-02 PROCEDURE — 85610 PROTHROMBIN TIME: CPT

## 2022-09-02 PROCEDURE — 88342 IMHCHEM/IMCYTCHM 1ST ANTB: CPT

## 2022-09-02 PROCEDURE — 88341 IMHCHEM/IMCYTCHM EA ADD ANTB: CPT

## 2022-09-02 PROCEDURE — 85049 AUTOMATED PLATELET COUNT: CPT

## 2022-09-02 PROCEDURE — 47000 NEEDLE BIOPSY OF LIVER PERQ: CPT | Performed by: STUDENT IN AN ORGANIZED HEALTH CARE EDUCATION/TRAINING PROGRAM

## 2022-09-02 PROCEDURE — 36415 COLL VENOUS BLD VENIPUNCTURE: CPT

## 2022-09-02 PROCEDURE — 85730 THROMBOPLASTIN TIME PARTIAL: CPT

## 2022-09-02 PROCEDURE — 6360000002 HC RX W HCPCS: Performed by: STUDENT IN AN ORGANIZED HEALTH CARE EDUCATION/TRAINING PROGRAM

## 2022-09-02 PROCEDURE — 7100000011 HC PHASE II RECOVERY - ADDTL 15 MIN

## 2022-09-02 PROCEDURE — 88307 TISSUE EXAM BY PATHOLOGIST: CPT

## 2022-09-02 PROCEDURE — 2500000003 HC RX 250 WO HCPCS: Performed by: STUDENT IN AN ORGANIZED HEALTH CARE EDUCATION/TRAINING PROGRAM

## 2022-09-02 PROCEDURE — 88313 SPECIAL STAINS GROUP 2: CPT

## 2022-09-02 PROCEDURE — 7100000010 HC PHASE II RECOVERY - FIRST 15 MIN

## 2022-09-02 RX ORDER — MIDAZOLAM HYDROCHLORIDE 1 MG/ML
INJECTION INTRAMUSCULAR; INTRAVENOUS
Status: COMPLETED | OUTPATIENT
Start: 2022-09-02 | End: 2022-09-02

## 2022-09-02 RX ORDER — LIDOCAINE HYDROCHLORIDE 10 MG/ML
INJECTION, SOLUTION EPIDURAL; INFILTRATION; INTRACAUDAL; PERINEURAL
Status: COMPLETED | OUTPATIENT
Start: 2022-09-02 | End: 2022-09-02

## 2022-09-02 RX ORDER — FENTANYL CITRATE 50 UG/ML
INJECTION, SOLUTION INTRAMUSCULAR; INTRAVENOUS
Status: COMPLETED | OUTPATIENT
Start: 2022-09-02 | End: 2022-09-02

## 2022-09-02 RX ADMIN — FENTANYL CITRATE 50 MCG: 50 INJECTION, SOLUTION INTRAMUSCULAR; INTRAVENOUS at 10:22

## 2022-09-02 RX ADMIN — MIDAZOLAM HYDROCHLORIDE 1 MG: 2 INJECTION, SOLUTION INTRAMUSCULAR; INTRAVENOUS at 10:22

## 2022-09-02 RX ADMIN — LIDOCAINE HYDROCHLORIDE 1 ML: 10 INJECTION, SOLUTION EPIDURAL; INFILTRATION; INTRACAUDAL; PERINEURAL at 10:26

## 2022-09-02 ASSESSMENT — PAIN - FUNCTIONAL ASSESSMENT: PAIN_FUNCTIONAL_ASSESSMENT: NONE - DENIES PAIN

## 2022-09-02 NOTE — BRIEF OP NOTE
Interventional Radiology Post Procedure    Date: 9/2/2022    Physician: Liane Wolf MD    Pre-op Diagnosis: liver lesion    Post-op Diagnosis: same    Variation from Planned Procedure: None       Findings: liver lesion biopsy without complication    Patient condition: Stable    Estimated Blood Loss: less than 50 ml    Specimens:  3 core specimens were obtained

## 2022-09-02 NOTE — DISCHARGE INSTRUCTIONS
LIVER BIOPSY DISCHARGE INSTRUCTIONS    Post-procedure Care    Biopsy results typically take 2-3 business days. Results will be sent to the doctor that ordered the test.     [address]  A responsible person should be with you for the next 24 hours. Please follow the instructions checked below:    ACTIVITY INSTRUCTIONS:  [x]Rest today. Increase activity as tolerated    [x]No heavy lifting or strenuous activity x 1 week    [x]No driving today or as instructed by your doctor  []Other   WOUND/DRESSING INSTRUCTIONS:     Always clean your hands before and after caring for the wound. [x]May shower today  [x]Avoid tub baths, hot tub, swimming pool etc. (do not submerge site in water) For 1 week to prevent infection     [x]Remove dressing in 2 days, may apply band-aid                            []Other       MEDICATION INSTRUCTIONS:  [x]Resume your prescibed medications    [x]You may take a non-prescription headache remedy, preferably one that does not contain aspirin. [x]Give a list of your medications to your primary care physician on your next visit. Keep your medication list updated and carry it with in case of emergencies. IF YOU TAKE ANTICOAGULANTS:  You may typically re-start blood thinning drugs the day after your procedure UNLESS directed otherwise by the doctor who prescribes the drug for you.   Some common blood thinners are:  Anti-inflammatory drugs (motrin, aleve)     Aspirin  Anti-coagulants such as plavix (clopidogrel) or coumadin (warfarin)    After arriving home, contact your doctor if any of the following occurs:   Signs of infection, including fever and chills  Redness, swelling, increasing pain, excessive bleeding, or any discharge from the incision site  Severe abdominal pain, persistent nausea or vomiting  A faint or light-headed feeling  Severe shoulder pain (right shoulder aching is typical of liver biopsy and should resolve in a day or two)

## 2022-09-02 NOTE — SEDATION DOCUMENTATION
IMAGING SERVICES NURSING PROGRESS NOTE    Procedure:  Liver Biopsy  September 2, 2022  Maddie Pickard      Allergies: Allergies   Allergen Reactions    Other      Mold, trees       Vitals:    09/02/22 0858   BP: 119/62   Pulse: 62   Resp: 18   Temp: 98.2 °F (36.8 °C)   SpO2: 97%       Recent lab work reviewed with MD: yes   Procedure explained to patient by MD: yes   Informed consent obtained:yes  Family with patient: Yes    Mental Status:  Normal  Readiness to learn:  Yes  Barriers to learning: No    Pain Assessment Pre-Procedure:  Pain Present:  no  Pain Score:  0  Pain Quality/Description:  none    Time out Procedure Verification with:  [x] RN  [x] Physician  [x] Patient  [x] Other: CT Technologist  Procedure site marked, if applicable:  Yes    Note: Patient arrived A & O x 4, denies pain, breathing easily on room air, Spoke to Dr. Ivory Knox prior to procedure. Procedural sedation:  Fentanyl:  1 mcg  Versed:    1 mg      Post Procedureal Note:  Patient tolerated procedure well. Breathing easily on room air. Report given to Haroon Conde RN. Patient transported in stable conditon to room 5.       Plan of Care Goals:  Safety measures met:  Yes  Patient understands explanation of procedure:  Yes    Time in:  1022  Time out:  6739 Arben Washburn RN.  R.N. 9/2/2022

## 2022-09-21 PROBLEM — K21.9 ESOPHAGEAL REFLUX: Status: ACTIVE | Noted: 2022-09-21

## 2022-09-21 PROBLEM — N39.0 FREQUENT UTI: Status: ACTIVE | Noted: 2022-09-21

## 2022-09-21 PROBLEM — N39.0 URINARY TRACT INFECTION: Status: ACTIVE | Noted: 2022-09-21

## 2022-09-21 PROBLEM — C22.0 HEPATOCELLULAR CARCINOMA (HCC): Status: ACTIVE | Noted: 2022-09-09

## 2022-09-21 PROBLEM — D46.9 MYELODYSPLASTIC SYNDROME (HCC): Status: ACTIVE | Noted: 2022-09-21

## 2022-09-21 PROBLEM — J30.9 ALLERGIC RHINITIS: Status: ACTIVE | Noted: 2022-09-21

## 2022-09-21 PROBLEM — K59.1 FUNCTIONAL DIARRHEA: Status: ACTIVE | Noted: 2020-07-27

## 2022-09-21 PROBLEM — R89.8 EOSINOPHIL COUNT RAISED: Status: ACTIVE | Noted: 2022-09-21

## 2022-09-21 PROBLEM — C80.1 MALIGNANT NEOPLASM (HCC): Status: ACTIVE | Noted: 2020-10-20

## 2022-09-21 PROBLEM — E78.49 OTHER HYPERLIPIDEMIA: Status: ACTIVE | Noted: 2017-05-11

## 2022-09-21 PROBLEM — K74.60 NON-ALCOHOLIC CIRRHOSIS (HCC): Status: ACTIVE | Noted: 2021-10-25

## 2022-09-21 PROBLEM — D53.9 MACROCYTIC ANEMIA: Status: ACTIVE | Noted: 2022-09-21

## 2022-09-21 PROBLEM — T14.8XXA HEMATOMA: Status: ACTIVE | Noted: 2017-05-12

## 2022-09-21 PROBLEM — Z96.652 STATUS POST TOTAL LEFT KNEE REPLACEMENT: Status: ACTIVE | Noted: 2022-02-09

## 2022-09-21 PROBLEM — M21.371 RIGHT FOOT DROP: Status: ACTIVE | Noted: 2021-10-25

## 2022-09-21 PROBLEM — S73.004A HIP DISLOCATION, RIGHT, INITIAL ENCOUNTER (HCC): Status: ACTIVE | Noted: 2022-09-21

## 2022-09-21 PROBLEM — I15.9 SECONDARY HYPERTENSION: Status: ACTIVE | Noted: 2017-05-11

## 2022-09-21 PROBLEM — G62.9 NEUROPATHY: Status: ACTIVE | Noted: 2021-10-25

## 2022-09-21 PROBLEM — D49.0 LIVER NEOPLASM: Status: ACTIVE | Noted: 2022-09-21

## 2022-09-21 PROBLEM — R60.0 LOCALIZED EDEMA: Status: ACTIVE | Noted: 2021-10-25

## 2022-09-21 RX ORDER — OXYCODONE HYDROCHLORIDE 5 MG/1
TABLET ORAL
COMMUNITY
Start: 2022-09-13

## 2022-09-21 RX ORDER — ASPIRIN 81 MG/1
81 TABLET, CHEWABLE ORAL 2 TIMES DAILY
COMMUNITY
Start: 2022-02-09

## 2022-09-21 RX ORDER — LISINOPRIL AND HYDROCHLOROTHIAZIDE 12.5; 1 MG/1; MG/1
TABLET ORAL
COMMUNITY

## 2022-09-21 RX ORDER — AMLODIPINE BESYLATE AND ATORVASTATIN CALCIUM 5; 10 MG/1; MG/1
TABLET, FILM COATED ORAL
COMMUNITY

## 2022-09-21 RX ORDER — AMOXICILLIN 500 MG/1
CAPSULE ORAL
COMMUNITY
Start: 2022-09-17

## 2022-09-21 RX ORDER — FUROSEMIDE 40 MG/1
TABLET ORAL
COMMUNITY
Start: 2022-07-08

## 2022-09-21 RX ORDER — POTASSIUM CHLORIDE 20 MEQ/1
20 TABLET, EXTENDED RELEASE ORAL DAILY
COMMUNITY
Start: 2022-06-28

## 2022-09-21 RX ORDER — B-COMPLEX WITH VITAMIN C
TABLET ORAL
COMMUNITY

## 2022-09-21 RX ORDER — ATORVASTATIN CALCIUM 20 MG/1
TABLET, FILM COATED ORAL
COMMUNITY

## 2022-09-21 RX ORDER — POTASSIUM CHLORIDE 20 MEQ/1
TABLET, EXTENDED RELEASE ORAL
COMMUNITY
Start: 2022-09-01

## 2022-09-21 RX ORDER — CYANOCOBALAMIN (VITAMIN B-12) 500 MCG
LOZENGE ORAL
COMMUNITY

## 2022-09-26 NOTE — PROGRESS NOTES
[facility]  [address] [facility] [address] [place] [address], [address] [place] [address]  Phone: [phone]  Fax: [phone]    Visit Date: 10/4/2022    Subjective:   Amol Amin is a [de-identified] y.o. female referred by Dr. Francis Burgess for evaluation and management of hepatocellular carcinoma. Pt has been followed by Dr. Claudene Gaskin for diffuse non-Hodgkin's lymphoma (diagnosed Sept 2016). Pt has a history of cirrhosis of the liver due to RAY. No hepatitis. A MRI in August showed new liver lesions; an US guided biopsy 9/2/22 confirmed HCC. She is here today for evaluation for partial hepatectomy vs radioembolization/radiation to liver for treatment of [address] Reports she is feeling well, recovering from hip surgery. No jaundice.     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 hip surgery 2010    History of blood transfusion 01/2017    2 UNITS    Hyperlipidemia     Hypertension     Melanoma ([address]     right face    RAY (nonalcoholic steatohepatitis)     Venous insufficiency     RLE     Past Surgical History:   Procedure Laterality Date    CATARACT REMOVAL WITH IMPLANT Bilateral 12/2019    COLONOSCOPY      ENDOSCOPY, COLON, DIAGNOSTIC      EYE SURGERY Bilateral     TEAR DUCT RE-ROUTED    HIP SURGERY Right 05/11/2017    R total hip revision     HYSTERECTOMY (CERVIX STATUS UNKNOWN)      JESSICA BSO    INCONTINENCE SURGERY      JOINT REPLACEMENT Bilateral     hips, RIGHT X2, LEFT X1    KNEE ARTHROSCOPY Left     LEEP  1996    LIVER BIOPSY      NASAL SINUS SURGERY      OTHER SURGICAL HISTORY Left 09/13/2016    biopsy left ankle mass    OTHER SURGICAL HISTORY Left 10/05/2016    PORT-A-CATH PLACEMENT                     PORT SURGERY N/A 1/8/2020    REMOVE PORT-A-CATH performed by Teri Hunter MD at [address] Right     SKIN BIOPSY      melanoma    TOTAL HIP ARTHROPLASTY Right     TOTAL HIP ARTHROPLASTY Left     US GUIDED LIVER BIOPSY PERCUTANEOUS  9/2/2022    US GUIDED LIVER BIOPSY PERCUTANEOUS 9/2/2022 Sarah Arce MD [facility] ULTRASOUND       Social History     Tobacco Use    Smoking status: Never    Smokeless tobacco: Never   Substance Use Topics    Alcohol use: No    Drug use: No     Family History   Problem Relation Age of Onset    Cancer Mother 62        leukemia    Tuberculosis Father     Cancer Brother 72        lung    Cancer Brother 36        lung    Cancer Sister 36        Breast    Cancer Brother 62        prostate    Cancer Brother         lung    Cancer Brother         Prostate Cancet       Allergies:  Other  Current Outpatient Medications   Medication Sig Dispense Refill    Ascorbic Acid 500 MG CAPS Ascorbic Acid 500 MG Oral Tablet  orally 500.0 1     Active      aspirin 81 MG chewable tablet Take 81 mg by mouth 2 times daily      lisinopril-hydroCHLOROthiazide (PRINZIDE;ZESTORETIC) 10-12.5 MG per tablet hydrochlorothiazide 12.5 MG / lisinopril 10 MG Oral Tablet  Oral 1.0 1     Inactive      potassium chloride (KLOR-CON M) 20 MEQ extended release tablet Take 20 mEq by mouth daily      potassium chloride (KLOR-CON M) 20 MEQ extended release tablet       Vitamin E 400 units TABS vitamin E 180 MG Oral Tablet     twice daily   Active      Calcium Carbonate-Vitamin D (OYSTER SHELL CALCIUM/D) 500-200 MG-UNIT TABS calcium carbonate 1250 MG / cholecalciferol 200 UNT Oral Tablet  Oral 1.0 1     Active      furosemide (LASIX) 40 MG tablet       VITAMIN B COMPLEX-C PO Take 1 tablet by mouth daily      atorvastatin (LIPITOR) 20 MG tablet Atorvastatin Oral     daily   active      amLODIPine-atorvastatatin (CADUET) 5-10 MG per tablet Atorvastatin Oral     daily   active      cholecalciferol 10 MCG/ML LIQD Take by mouth daily      [place]-Min Oil-Zn Ox-Petrolatum (PREVACARE PERSONAL PROTECT EX)       amoxicillin (AMOXIL) 500 MG capsule       oxyCODONE (ROXICODONE) 5 MG immediate release tablet       atorvastatin (LIPITOR) 10 MG tablet Take 10 mg by mouth daily      vitamin E 400 UNIT capsule Take 400 Units by mouth 2 times daily      calcium-vitamin D (OSCAL-500) 500-200 MG-UNIT per tablet Take 1 tablet by mouth daily      Multiple Vitamins-Minerals (THERAPEUTIC MULTIVITAMIN-MINERALS) tablet Take 1 tablet by mouth daily      ascorbic acid (VITAMIN C) 500 MG tablet Take 500 mg by mouth daily       No current facility-administered medications for this visit. Facility-Administered Medications Ordered in Other Visits   Medication Dose Route Frequency Provider Last Rate Last Admin    furosemide (LASIX) tablet 40 mg  40 mg Oral Once Osvaldo Min, APRN - CNP           Review of Systems: See HPI. All other systems reviewed and are negative. Objective:     Vitals:    09/27/22 1236   BP: 122/65   Site: Right Upper Arm   Pulse: 71   Temp: 98.1 °F (36.7 °C)   TempSrc: Temporal   Weight: 175 lb 9.6 oz (79.7 kg)   Height: 5' 8\" (1.727 m)       Physical Exam:   General:  Alert, oriented x 3, cooperative, no distress, appears stated age. Skin: Skin color, texture, turgor normal.    Lymph nodes: Cervical, supraclavicular, and axillary nodes normal.   HENT:  Normocephalic, without obvious abnormality. Moist mucus membranes. No icterus. Lungs: No respiratory distress. Heart:  Regular rate and rhythm. No murmur. Abdomen: Soft, non-tender. No masses,  No organomegaly. Extremities: Extremities normal, atraumatic, no cyanosis or edema. Neurologic: Grossly intact. Psychiatric: Appropriate mood and thought process     Pathology:   9/2/22 Liver Biopsy:   FINAL DIAGNOSIS:     Liver, biopsy:     -Hepatocellular carcinoma.      COMMENT:   Immunohistochemical and special stains were performed on block   A1 with the following results: HepPar-positive, glypican-3-equivocal,   arginase 1-positive, reticulin-thickened hepatic plates, ZK2/HX3-TXUR   positive, CK7-negative, and [id number]. These results support the   above diagnosis. NIELE/NIELE     Imaging:   PET 9/22:   Mass identified in segment 4A of the liver demonstrates intense abnormal activity compatible with malignancy. Previous MRI suggested hepatocellular carcinoma. Mass identified in segment 8 of liver demonstrates no significant activity on PET/CT. This lesion was biopsied with pathology confirming [address] No FDG avid lesion elsewhere to suggest malignancy or metastatic disease. No lymphadenopathy identified. Stable cytomegaly. Labs:   8/29/22   CA 15-3 12.9  CEA 12.9   8.2   .2    9/14/22  .2     WBC 4.4  Hgb 11.2  HCT 34.3    Glucose 101  BUN 16  Cr 0.63  Na 139  K 4.1  Cl 103  Co2 27    Bili 1.1  ALK Phos 123  AST 84  ALT 73    ECOG Performance Status   (2) Ambulatory and capable of self care, unable to carry out work activity, up and about > 50% or waking hours    Assessment/Plan:      Diagnosis Orders   1. Hepatocellular carcinoma ([address]          Multiple imaging studies, labs and outside records reviewed. Reviewed diagnosis and treatment options. One option for treatment of [address] is liver transplant, however given her age, co-morbid conditions, and history of lymphoma, this does not seem to be a viable option. Also discussed surgical excision. Explained in detail about robotic / laparoscopic liver resection and possible open. High risk nature of surgery discussed. Due to risk of requiring an open surgery and co-morbid conditions she may not tolerate a large surgery. Recommended blocking or chemoembolization to treat tumor. Explained this procedure in detail and referral placed. Also discussed ablation. I don't think this is feasible due to location of mass. Option of chemotherapy also discussed - follow up with Dr. Ihsan Hernández  Importance of establishing care goals discussed. Follow-up in 3 months. Seun Lama MD  Surgery Attending

## 2022-09-27 ENCOUNTER — OFFICE VISIT (OUTPATIENT)
Dept: SURGERY | Age: 82
End: 2022-09-27

## 2022-09-27 VITALS
BODY MASS INDEX: 26.61 KG/M2 | WEIGHT: 175.6 LBS | HEIGHT: 68 IN | SYSTOLIC BLOOD PRESSURE: 122 MMHG | DIASTOLIC BLOOD PRESSURE: 65 MMHG | HEART RATE: 71 BPM | TEMPERATURE: 98.1 F

## 2022-09-27 DIAGNOSIS — C22.0 HEPATOCELLULAR CARCINOMA (HCC): Primary | ICD-10-CM

## 2022-10-04 ENCOUNTER — TELEPHONE (OUTPATIENT)
Dept: SURGERY | Age: 82
End: 2022-10-04

## 2022-10-04 NOTE — TELEPHONE ENCOUNTER
Galen Joyner called stating she was expecting a call to schedule testing.      Please call: [phone]

## 2022-10-13 ENCOUNTER — HOSPITAL ENCOUNTER (OUTPATIENT)
Dept: INTERVENTIONAL RADIOLOGY/VASCULAR | Age: 82
Discharge: HOME | End: 2022-10-13

## 2022-10-13 NOTE — H&P
Interventional Radiology consultation for Y90 Therasphere Radioembolization    Date of consult : 10/13/22    Referring physician : Juhi Dunbar    Oncologist : Sarika    [facility] secondary to RAY cirrhosis    Abdominal Imaging :  PET/CT 9/22/22, MRI liver 8/1/22    Prior liver surgery : none    Prior radiation to the liver : none    Prior biliary procedure, ERCP, sphincterotomy, or gastric bypass : none    Imaging or biopsy evidence of cirrhosis : yes   If cirrhotic : MELD 7 Child Mejía A    Ascites : none    Dialysis : none    Anticoagulation : none    ECOG score : 2    Laboratory evaluation :    Sodium    Lab Results   Component Value Date     07/28/2017        Creatinine    Lab Results   Component Value Date    CREATININE 0.77 07/28/2017        INR     Lab Results   Component Value Date    INR 1.15 (H) 09/02/2022        Platelets    Lab Results   Component Value Date    PLT 69 (L) 09/02/2022        Albumin    Lab Results   Component Value Date    LABALBU 3.8 07/28/2017       Total bilirubin    Lab Results   Component Value Date    BILITOT 0.9 07/28/2017         Plan : 81F with [facility] secondary to RAY cirrhosis. Proceed with y90. Plan for dual dose administration in segments 4A and 8. Labs will be updated and an addendum will be added to this note if there are changes in risk stratification.     Updated labs (9/14/22) and risk stratification:  Na 139  INR 1.1 (9/2/22)  Albumin 3.0  Cr 0.6  Plt 71  Tbili 1.1  .2    MELD 8  Rahda-Mejía B  ECOG 2

## 2022-10-21 PROBLEM — N39.0 URINARY TRACT INFECTION: Status: RESOLVED | Noted: 2022-09-21 | Resolved: 2022-10-21

## 2022-10-24 ENCOUNTER — TELEPHONE (OUTPATIENT)
Dept: CARDIAC CATH/INVASIVE PROCEDURES | Age: 82
End: 2022-10-24

## 2022-10-24 DIAGNOSIS — K76.9 LIVER LESION: Primary | ICD-10-CM

## 2022-10-24 NOTE — TELEPHONE ENCOUNTER
Pt scheduled for CTA abd w contrast on 10/26. Pt will go check in following their appointment with radiation oncology.

## 2022-10-26 ENCOUNTER — HOSPITAL ENCOUNTER (OUTPATIENT)
Dept: CT IMAGING | Age: 82
Discharge: HOME | End: 2022-10-26
Payer: MEDICARE

## 2022-10-26 DIAGNOSIS — K76.9 LIVER LESION: Primary | ICD-10-CM

## 2022-10-26 DIAGNOSIS — K76.9 LIVER LESION: ICD-10-CM

## 2022-10-26 LAB
GFR SERPL CREATININE-BSD FRML MDRD: >60 ML/MIN/{1.73_M2}
PERFORMED ON: NORMAL
POC CREATININE: 0.7 MG/DL (ref 0.6–1.2)
POC SAMPLE TYPE: NORMAL

## 2022-10-26 PROCEDURE — 74175 CTA ABDOMEN W/CONTRAST: CPT

## 2022-10-26 PROCEDURE — 82565 ASSAY OF CREATININE: CPT

## 2022-10-26 PROCEDURE — 6360000004 HC RX CONTRAST MEDICATION: Performed by: STUDENT IN AN ORGANIZED HEALTH CARE EDUCATION/TRAINING PROGRAM

## 2022-10-26 RX ADMIN — IOPAMIDOL 75 ML: 755 INJECTION, SOLUTION INTRAVENOUS at 15:47

## 2022-10-26 NOTE — PROGRESS NOTES
Pt scheduled for Y-90 planning on 11/7 at 1130am. Pt told to arrive at 10am, and to be NPO after midnight. Message left with pts daughter Fermín Schmid, and phone number left to get appointment details.

## 2022-11-04 NOTE — PRE-PROCEDURE INSTRUCTIONS
Called patient about procedure. Told to be here at 1000 for procedure at 1130. NPO after midnight, but can take morning medication with sips of water, patient stated they are not on blood thinners. To have a responsible adult be with patient take them home and stay with them afterwards, if they do not get admitted to [address] [place] [address]. And if available bring current list of medications. No other questions or concerns.

## 2022-11-07 ENCOUNTER — HOSPITAL ENCOUNTER (OUTPATIENT)
Dept: NUCLEAR MEDICINE | Age: 82
Discharge: HOME | End: 2022-11-07
Payer: MEDICARE

## 2022-11-07 ENCOUNTER — HOSPITAL ENCOUNTER (OUTPATIENT)
Dept: INTERVENTIONAL RADIOLOGY/VASCULAR | Age: 82
Discharge: HOME | End: 2022-11-07
Payer: MEDICARE

## 2022-11-07 VITALS — HEIGHT: 63 IN | BODY MASS INDEX: 30.3 KG/M2 | TEMPERATURE: 97.9 F | WEIGHT: 171 LBS

## 2022-11-07 DIAGNOSIS — K76.9 LIVER LESION: ICD-10-CM

## 2022-11-07 LAB
A/G RATIO: 1.6 (ref 1.1–2.2)
ALBUMIN SERPL-MCNC: 4.2 G/DL (ref 3.4–5)
ALP BLD-CCNC: 126 U/L (ref 40–129)
ALT SERPL-CCNC: 28 U/L (ref 10–40)
ANION GAP SERPL CALCULATED.3IONS-SCNC: 9 MMOL/L (ref 3–16)
AST SERPL-CCNC: 41 U/L (ref 15–37)
BILIRUB SERPL-MCNC: 1 MG/DL (ref 0–1)
BUN BLDV-MCNC: 15 MG/DL (ref 7–20)
CALCIUM SERPL-MCNC: 9.2 MG/DL (ref 8.3–10.6)
CHLORIDE BLD-SCNC: 100 MMOL/L (ref 99–110)
CO2: 28 MMOL/L (ref 21–32)
CREAT SERPL-MCNC: 0.7 MG/DL (ref 0.6–1.2)
GFR SERPL CREATININE-BSD FRML MDRD: >60 ML/MIN/{1.73_M2}
GLUCOSE BLD-MCNC: 102 MG/DL (ref 70–99)
HCT VFR BLD CALC: 39.2 % (ref 36–48)
HEMOGLOBIN: 13.2 G/DL (ref 12–16)
INR BLD: 1.14 (ref 0.87–1.14)
MCH RBC QN AUTO: 32.7 PG (ref 26–34)
MCHC RBC AUTO-ENTMCNC: 33.7 G/DL (ref 31–36)
MCV RBC AUTO: 97 FL (ref 80–100)
PDW BLD-RTO: 14.1 % (ref 12.4–15.4)
PLATELET # BLD: 60 K/UL (ref 135–450)
PLATELET SLIDE REVIEW: ABNORMAL
PMV BLD AUTO: 9.9 FL (ref 5–10.5)
POTASSIUM SERPL-SCNC: 3.7 MMOL/L (ref 3.5–5.1)
PROTHROMBIN TIME: 14.6 SEC (ref 11.7–14.5)
RBC # BLD: 4.03 M/UL (ref 4–5.2)
SODIUM BLD-SCNC: 137 MMOL/L (ref 136–145)
TOTAL PROTEIN: 6.9 G/DL (ref 6.4–8.2)
WBC # BLD: 2.9 K/UL (ref 4–11)

## 2022-11-07 PROCEDURE — C1760 CLOSURE DEV, VASC: HCPCS

## 2022-11-07 PROCEDURE — 80053 COMPREHEN METABOLIC PANEL: CPT

## 2022-11-07 PROCEDURE — 99153 MOD SED SAME PHYS/QHP EA: CPT

## 2022-11-07 PROCEDURE — A9540 TC99M MAA: HCPCS | Performed by: STUDENT IN AN ORGANIZED HEALTH CARE EDUCATION/TRAINING PROGRAM

## 2022-11-07 PROCEDURE — C1769 GUIDE WIRE: HCPCS

## 2022-11-07 PROCEDURE — 6360000004 HC RX CONTRAST MEDICATION: Performed by: STUDENT IN AN ORGANIZED HEALTH CARE EDUCATION/TRAINING PROGRAM

## 2022-11-07 PROCEDURE — 6360000002 HC RX W HCPCS

## 2022-11-07 PROCEDURE — 2500000003 HC RX 250 WO HCPCS

## 2022-11-07 PROCEDURE — C1887 CATHETER, GUIDING: HCPCS

## 2022-11-07 PROCEDURE — C1894 INTRO/SHEATH, NON-LASER: HCPCS

## 2022-11-07 PROCEDURE — 78201 LIVER IMAGING STATIC ONLY: CPT

## 2022-11-07 PROCEDURE — 85610 PROTHROMBIN TIME: CPT

## 2022-11-07 PROCEDURE — 3430000000 HC RX DIAGNOSTIC RADIOPHARMACEUTICAL: Performed by: STUDENT IN AN ORGANIZED HEALTH CARE EDUCATION/TRAINING PROGRAM

## 2022-11-07 PROCEDURE — 36247 INS CATH ABD/L-EXT ART 3RD: CPT

## 2022-11-07 PROCEDURE — 75774 ARTERY X-RAY EACH VESSEL: CPT

## 2022-11-07 PROCEDURE — 85027 COMPLETE CBC AUTOMATED: CPT

## 2022-11-07 PROCEDURE — 76377 3D RENDER W/INTRP POSTPROCES: CPT

## 2022-11-07 PROCEDURE — 99152 MOD SED SAME PHYS/QHP 5/>YRS: CPT

## 2022-11-07 PROCEDURE — 75726 ARTERY X-RAYS ABDOMEN: CPT

## 2022-11-07 PROCEDURE — 2709999900 HC NON-CHARGEABLE SUPPLY

## 2022-11-07 RX ADMIN — IOHEXOL 115 ML: 350 INJECTION, SOLUTION INTRAVENOUS at 13:52

## 2022-11-07 RX ADMIN — Medication 6 MILLICURIE: at 14:30

## 2022-11-07 NOTE — DISCHARGE INSTRUCTIONS
[facility]  Cardiovascular Special Procedures  Angiogram/Cardiac Catheterization  Patient Discharge Instructions           Day 1 (Procedure Day):  Rest for the remainder of the day. Minimal stair climbing, if you must use stairs, lead with your unaffected leg. Do not drive a car. Do not be alone. Avoid prolonged sitting, walk around occasionally until bedtime tonight. Leave dressing intact. Day 2:  You may climb stairs, begin slowly  You may drive a car, unless otherwise directed by physician. Remove dressing. You may bathe/shower, but wash gently around the puncture site and pat dry. Place new band-aid on site daily until skin heals. Things to Avoid:  You may not do any strenuous exercises for one week. (ex: golfing, bowling, tennis, vacuum, snow removal, jogging, and aerobic exercises). No hot tubs, baths, or pools for one week. Do not lift anything over 10 pounds for 10 days. Avoid positions that would put pressure on your groin. Like sitting upright in a straight back chair. No lotions, powders, or ointments near site for 5 days. Things to watch for:  You may have a small lump or a bruise. This is common and will go away. If the lump increases and site becomes painful, call physician immediately. If mild discomfort occurs at the puncture site you may place an ice pack on the site at 15 minute intervals. If the puncture site starts to bleed, immediately lie on a hard flat surface and apply pressure just above the puncture site. Have someone call 911 and maintain pressure until the EMS arrives. If you have loss of color, extreme coldness or numbness of the leg, call 911 immediately. Excessive pain of the groin, thigh or calf, call your physician immediately. Watch for signs and symptoms of an infection at the groin site (fever, local pain, redness, warmth or discharge from the puncture site), call your physician immediately if any develop.       Any Questions please call us at [phone] (between 7am- 5pm, Mon-Fri). After hours you should contact your physician. [facility]  Cardiovascular Special Procedures  General Discharge Instructions    PROCEDURE: Y90 Planning    __X__ You may be drowsy or lightheaded after receiving sedation. DO NOT operate a vehicle (automobile, bicycle, motorcycle, machinery, or power tools), make any important decisions or sign any important/legal documents, or drink alcoholic beverages for the next 24 hours  _X___ We strongly suggest that a responsible adult be with you for the next 24 hours for your protection and safety  ____ If the intravenous catheter site is painful, apply warm wet compresses on the site until the soreness is relieved and elevate the arm above the heart. Call your physician if no improvement in 2 to 3 days    DIETARY INSTRUCTIONS:    ____ Drink extra fluids over the next 24 hours (If not contraindicated by illness or by physician order)  ____ Start with clear liquids and progress to normal diet as you feel like eating. If you experience nausea or repeated episodes of vomiting, which persist beyond 12-24 hours, notify your doctor        __X__ Resume your previous diet      MEDICATION INSTRUCTIONS:    ____ See Medication Reconciliation Sheet      SPECIAL INSTRUCTIONS:  ________________________________________________________________________________________________________________________________________________________________________________________________________________________                                                                                                                                                                                                                                                                                                Please make sure that you follow-up with your doctors office for your results.       FOLLOW-UP APPOINTMENT    Follow up with MD as directed    Belongings returned to patient and/or family: YES. The Discharge Instructions have been explained to me. I understand and can verbalize these instructions.

## 2022-11-07 NOTE — PROCEDURES
Interventional Radiology Post Procedure    Date: 11/7/2022    Physician: Shruthi Mixon MD    Pre-op Diagnosis: [address]    Post-op Diagnosis: same    Variation from Planned Procedure: None       Findings: Successful y90 mapping. Both lesions were encompassed by supply from the right hepatic artery.     Patient condition: Stable    Estimated Blood Loss: Minimal    Specimens:  None      Signed,  Merlin Jeffrey MD  2:10 PM  11/7/2022

## 2022-11-07 NOTE — H&P
Patient:  Karuna Barrett   :   1940      Relevant clinical history, particularly as it involves the pending procedure, was reviewed and discussed. The procedure including risks and benefits was discussed at length with the patient (or designated family member) and all questions were answered. Informed consent to proceed with the procedure was given. Vital signs were monitored and documented by the Radiology nurse. Targeted physical examination  Heart : regular rate and rhythm  Lungs : clear, breathing easily  Condition : stable    [facility] nurses notes reviewed and agreed.     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     History of blood transfusion 2017    2 UNITS    Hyperlipidemia     Hypertension     Melanoma ([facility]     right face    RAY (nonalcoholic steatohepatitis)     Venous insufficiency     RLE       Past Surgical History:           Procedure Laterality Date    CATARACT REMOVAL WITH IMPLANT Bilateral 2019    COLONOSCOPY      ENDOSCOPY, COLON, DIAGNOSTIC      EYE SURGERY Bilateral     TEAR DUCT RE-ROUTED    HIP SURGERY Right 2017    R total hip revision     HYSTERECTOMY (CERVIX STATUS UNKNOWN)      JESSICA BSO    INCONTINENCE SURGERY      JOINT REPLACEMENT Bilateral     hips, RIGHT X2, LEFT X1    KNEE ARTHROSCOPY Left     LEEP      LIVER BIOPSY      NASAL SINUS SURGERY      OTHER SURGICAL HISTORY Left 2016    biopsy left ankle mass    OTHER SURGICAL HISTORY Left 10/05/2016    PORT-A-CATH PLACEMENT                     PORT SURGERY N/A 2020    REMOVE PORT-A-CATH performed by Seema Urban MD at [address] Right     SKIN BIOPSY      melanoma    TOTAL HIP ARTHROPLASTY Right     TOTAL HIP ARTHROPLASTY Left     US GUIDED LIVER BIOPSY PERCUTANEOUS  9/2/2022    US GUIDED LIVER BIOPSY PERCUTANEOUS 9/2/2022 Priscila Hatfield MD [facility]       Allergies:   Other    Medications:   Home Meds  Current Outpatient Medications on File Prior to Encounter   Medication Sig Dispense Refill    Ascorbic Acid 500 MG CAPS Ascorbic Acid 500 MG Oral Tablet  orally 500.0 1     Active (Patient not taking: Reported on 11/7/2022)      aspirin 81 MG chewable tablet Take 81 mg by mouth 2 times daily (Patient not taking: Reported on 11/7/2022)      lisinopril-hydroCHLOROthiazide (PRINZIDE;ZESTORETIC) 10-12.5 MG per tablet hydrochlorothiazide 12.5 MG / lisinopril 10 MG Oral Tablet  Oral 1.0 1     Inactive      potassium chloride (KLOR-CON M) 20 MEQ extended release tablet Take 20 mEq by mouth daily      potassium chloride (KLOR-CON M) 20 MEQ extended release tablet  (Patient not taking: Reported on 11/7/2022)      Vitamin E 400 units TABS vitamin E 180 MG Oral Tablet     twice daily   Active      Calcium Carbonate-Vitamin D (OYSTER SHELL CALCIUM/D) 500-200 MG-UNIT TABS calcium carbonate 1250 MG / cholecalciferol 200 UNT Oral Tablet  Oral 1.0 1     Active (Patient not taking: Reported on 11/7/2022)      furosemide (LASIX) 40 MG tablet       VITAMIN B COMPLEX-C PO Take 1 tablet by mouth daily      atorvastatin (LIPITOR) 20 MG tablet Atorvastatin Oral     daily   active      amLODIPine-atorvastatatin (CADUET) 5-10 MG per tablet Atorvastatin Oral     daily   active (Patient not taking: Reported on 11/7/2022)      cholecalciferol 10 MCG/ML LIQD Take by mouth daily (Patient not taking: Reported on 11/7/2022)      Wilton-Min Oil-Zn Ox-Petrolatum (PREVACARE PERSONAL PROTECT EX)       amoxicillin (AMOXIL) 500 MG capsule  (Patient not taking: Reported on 11/7/2022)      oxyCODONE (ROXICODONE) 5 MG immediate release tablet  (Patient not taking: Reported on 11/7/2022)      atorvastatin (LIPITOR) 10 MG tablet Take 10 mg by mouth daily vitamin E 400 UNIT capsule Take 400 Units by mouth 2 times daily (Patient not taking: Reported on 11/7/2022)      calcium-vitamin D (OSCAL-500) 500-200 MG-UNIT per tablet Take 1 tablet by mouth daily (Patient not taking: Reported on 11/7/2022)      Multiple Vitamins-Minerals (THERAPEUTIC MULTIVITAMIN-MINERALS) tablet Take 1 tablet by mouth daily (Patient not taking: Reported on 11/7/2022)      ascorbic acid (VITAMIN C) 500 MG tablet Take 500 mg by mouth daily       Current Facility-Administered Medications on File Prior to Encounter   Medication Dose Route Frequency Provider Last Rate Last Admin    furosemide (LASIX) tablet 40 mg  40 mg Oral Once Alvino Gomes, APRN - CNP           Current Meds  No current facility-administered medications for this encounter.   furosemide (LASIX) tablet 40 mg, Once          ASA 3 - Patient with moderate systemic disease with functional limitations    II (soft palate, uvula, fauces visible)    Activity:  2 - Able to move 4 extremities voluntarily on command  Respiration:  2 - Able to breathe deeply and cough freely  Circulation:  2 - BP+/- 20mmHg of normal  Consciousness:  2 - Fully awake  Oxygen Saturation (color):  2 - Able to maintain oxygen saturation >92% on room air    Sedation : Moderate sedation planned

## 2022-11-09 ENCOUNTER — TELEPHONE (OUTPATIENT)
Dept: CARDIAC CATH/INVASIVE PROCEDURES | Age: 82
End: 2022-11-09

## 2022-11-09 NOTE — TELEPHONE ENCOUNTER
Follow up [facility] [address] [address] phone call made. Pt states that the groin site is clean/dry/intact. Will call to schedule treatment.

## 2022-11-16 ENCOUNTER — TELEPHONE (OUTPATIENT)
Dept: CARDIAC CATH/INVASIVE PROCEDURES | Age: 82
End: 2022-11-16

## 2022-11-16 NOTE — TELEPHONE ENCOUNTER
Pt scheduled for Y90 treatment 12/20 at 730a. Told to arrive at 630a, and to be NPO after midnight. Pts daughter Chucho Olivas called and updated with day and time.

## 2022-12-20 ENCOUNTER — HOSPITAL ENCOUNTER (OUTPATIENT)
Dept: INTERVENTIONAL RADIOLOGY/VASCULAR | Age: 82
Discharge: HOME | End: 2022-12-20
Payer: MEDICARE

## 2022-12-20 VITALS — TEMPERATURE: 97.9 F | BODY MASS INDEX: 28.34 KG/M2 | HEIGHT: 64 IN | WEIGHT: 166 LBS

## 2022-12-20 DIAGNOSIS — K76.9 LIVER LESION: ICD-10-CM

## 2022-12-20 LAB
A/G RATIO: 1.5 (ref 1.1–2.2)
ALBUMIN SERPL-MCNC: 4.1 G/DL (ref 3.4–5)
ALP BLD-CCNC: 115 U/L (ref 40–129)
ALT SERPL-CCNC: 25 U/L (ref 10–40)
ANION GAP SERPL CALCULATED.3IONS-SCNC: 9 MMOL/L (ref 3–16)
AST SERPL-CCNC: 40 U/L (ref 15–37)
BILIRUB SERPL-MCNC: 0.8 MG/DL (ref 0–1)
BUN BLDV-MCNC: 13 MG/DL (ref 7–20)
CALCIUM SERPL-MCNC: 9.3 MG/DL (ref 8.3–10.6)
CHLORIDE BLD-SCNC: 104 MMOL/L (ref 99–110)
CO2: 27 MMOL/L (ref 21–32)
CREAT SERPL-MCNC: 0.7 MG/DL (ref 0.6–1.2)
GFR SERPL CREATININE-BSD FRML MDRD: >60 ML/MIN/{1.73_M2}
GLUCOSE BLD-MCNC: 89 MG/DL (ref 70–99)
HCT VFR BLD CALC: 37.9 % (ref 36–48)
HEMOGLOBIN: 12.9 G/DL (ref 12–16)
INR BLD: 1.13 (ref 0.87–1.14)
MCH RBC QN AUTO: 32.7 PG (ref 26–34)
MCHC RBC AUTO-ENTMCNC: 34 G/DL (ref 31–36)
MCV RBC AUTO: 96.2 FL (ref 80–100)
PDW BLD-RTO: 15.2 % (ref 12.4–15.4)
PLATELET # BLD: 60 K/UL (ref 135–450)
PLATELET SLIDE REVIEW: ABNORMAL
PMV BLD AUTO: 8.7 FL (ref 5–10.5)
POTASSIUM SERPL-SCNC: 4.1 MMOL/L (ref 3.5–5.1)
PROTHROMBIN TIME: 14.4 SEC (ref 11.7–14.5)
RBC # BLD: 3.94 M/UL (ref 4–5.2)
SLIDE REVIEW: ABNORMAL
SODIUM BLD-SCNC: 140 MMOL/L (ref 136–145)
TOTAL PROTEIN: 6.8 G/DL (ref 6.4–8.2)
WBC # BLD: 2.7 K/UL (ref 4–11)

## 2022-12-20 PROCEDURE — 3430000000 HC RX DIAGNOSTIC RADIOPHARMACEUTICAL: Performed by: STUDENT IN AN ORGANIZED HEALTH CARE EDUCATION/TRAINING PROGRAM

## 2022-12-20 PROCEDURE — C2616 BRACHYTX, NON-STR,YTTRIUM-90: HCPCS | Performed by: STUDENT IN AN ORGANIZED HEALTH CARE EDUCATION/TRAINING PROGRAM

## 2022-12-20 PROCEDURE — 80053 COMPREHEN METABOLIC PANEL: CPT

## 2022-12-20 PROCEDURE — C1894 INTRO/SHEATH, NON-LASER: HCPCS

## 2022-12-20 PROCEDURE — C1887 CATHETER, GUIDING: HCPCS

## 2022-12-20 PROCEDURE — 6360000004 HC RX CONTRAST MEDICATION: Performed by: STUDENT IN AN ORGANIZED HEALTH CARE EDUCATION/TRAINING PROGRAM

## 2022-12-20 PROCEDURE — 37243 VASC EMBOLIZE/OCCLUDE ORGAN: CPT

## 2022-12-20 PROCEDURE — 85027 COMPLETE CBC AUTOMATED: CPT

## 2022-12-20 PROCEDURE — 36247 INS CATH ABD/L-EXT ART 3RD: CPT

## 2022-12-20 PROCEDURE — 2500000003 HC RX 250 WO HCPCS

## 2022-12-20 PROCEDURE — 99153 MOD SED SAME PHYS/QHP EA: CPT

## 2022-12-20 PROCEDURE — C1760 CLOSURE DEV, VASC: HCPCS

## 2022-12-20 PROCEDURE — C1769 GUIDE WIRE: HCPCS

## 2022-12-20 PROCEDURE — 2709999900 HC NON-CHARGEABLE SUPPLY

## 2022-12-20 PROCEDURE — 99152 MOD SED SAME PHYS/QHP 5/>YRS: CPT

## 2022-12-20 PROCEDURE — 85610 PROTHROMBIN TIME: CPT

## 2022-12-20 PROCEDURE — 75774 ARTERY X-RAY EACH VESSEL: CPT

## 2022-12-20 PROCEDURE — 76377 3D RENDER W/INTRP POSTPROCES: CPT

## 2022-12-20 PROCEDURE — 79445 NUCLEAR RX INTRA-ARTERIAL: CPT

## 2022-12-20 PROCEDURE — 75726 ARTERY X-RAYS ABDOMEN: CPT

## 2022-12-20 PROCEDURE — 6360000002 HC RX W HCPCS

## 2022-12-20 RX ORDER — ONDANSETRON 2 MG/ML
4 INJECTION INTRAMUSCULAR; INTRAVENOUS EVERY 8 HOURS PRN
Status: DISCONTINUED | OUTPATIENT
Start: 2022-12-20 | End: 2022-12-21 | Stop reason: HOSPADM

## 2022-12-20 RX ORDER — OXYCODONE HYDROCHLORIDE 5 MG/1
5 TABLET ORAL EVERY 4 HOURS PRN
Status: DISCONTINUED | OUTPATIENT
Start: 2022-12-20 | End: 2022-12-21 | Stop reason: HOSPADM

## 2022-12-20 RX ORDER — ACETAMINOPHEN 325 MG/1
650 TABLET ORAL EVERY 4 HOURS PRN
Status: DISCONTINUED | OUTPATIENT
Start: 2022-12-20 | End: 2022-12-21 | Stop reason: HOSPADM

## 2022-12-20 RX ADMIN — Medication 1.5 GBQ: at 09:49

## 2022-12-20 RX ADMIN — IOHEXOL 150 ML: 350 INJECTION, SOLUTION INTRAVENOUS at 09:51

## 2022-12-20 NOTE — H&P
Patient:  Rogelio Hathaway   :   1940      Relevant clinical history, particularly as it involves the pending procedure, was reviewed and discussed. The procedure including risks and benefits was discussed at length with the patient (or designated family member) and all questions were answered. Informed consent to proceed with the procedure was given. Vital signs were monitored and documented by the Radiology nurse. Targeted physical examination  Heart : regular rate and rhythm  Lungs : clear, breathing easily  Condition : stable    [facility] nurses notes reviewed and agreed.     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     History of blood transfusion 2017    2 UNITS    Hyperlipidemia     Hypertension     Melanoma ([facility] [address]     right face    RAY (nonalcoholic steatohepatitis)     Venous insufficiency     RLE       Past Surgical History:           Procedure Laterality Date    CATARACT REMOVAL WITH IMPLANT Bilateral 2019    COLONOSCOPY      ENDOSCOPY, COLON, DIAGNOSTIC      EYE SURGERY Bilateral     TEAR DUCT RE-ROUTED    HIP SURGERY Right 2017    R total hip revision     HYSTERECTOMY (CERVIX STATUS UNKNOWN)      JESSICA BSO    INCONTINENCE SURGERY      JOINT REPLACEMENT Bilateral     hips, RIGHT X2, LEFT X1    KNEE ARTHROSCOPY Left     LEEP      LIVER BIOPSY      NASAL SINUS SURGERY      OTHER SURGICAL HISTORY Left 2016    biopsy left ankle mass    OTHER SURGICAL HISTORY Left 10/05/2016    PORT-A-CATH PLACEMENT                     PORT SURGERY N/A 2020    REMOVE PORT-A-CATH performed by Dotty Oseguera MD at [address] Right     SKIN BIOPSY      melanoma    TOTAL HIP ARTHROPLASTY Right     TOTAL HIP ARTHROPLASTY Left     US GUIDED LIVER BIOPSY PERCUTANEOUS  9/2/2022    US GUIDED LIVER BIOPSY PERCUTANEOUS 9/2/2022 Jadyn Barrett MD [facility]       Allergies:   Other    Medications:   Home Meds  Current Outpatient Medications on File Prior to Encounter   Medication Sig Dispense Refill    Ascorbic Acid 500 MG CAPS Ascorbic Acid 500 MG Oral Tablet  orally 500.0 1     Active (Patient not taking: Reported on 11/7/2022)      aspirin 81 MG chewable tablet Take 81 mg by mouth 2 times daily (Patient not taking: Reported on 11/7/2022)      lisinopril-hydroCHLOROthiazide (PRINZIDE;ZESTORETIC) 10-12.5 MG per tablet hydrochlorothiazide 12.5 MG / lisinopril 10 MG Oral Tablet  Oral 1.0 1     Inactive      potassium chloride (KLOR-CON M) 20 MEQ extended release tablet Take 20 mEq by mouth daily      potassium chloride (KLOR-CON M) 20 MEQ extended release tablet  (Patient not taking: Reported on 11/7/2022)      Vitamin E 400 units TABS vitamin E 180 MG Oral Tablet     twice daily   Active      Calcium Carbonate-Vitamin D (OYSTER SHELL CALCIUM/D) 500-200 MG-UNIT TABS calcium carbonate 1250 MG / cholecalciferol 200 UNT Oral Tablet  Oral 1.0 1     Active (Patient not taking: Reported on 11/7/2022)      furosemide (LASIX) 40 MG tablet       VITAMIN B COMPLEX-C PO Take 1 tablet by mouth daily      atorvastatin (LIPITOR) 20 MG tablet Atorvastatin Oral     daily   active      amLODIPine-atorvastatatin (CADUET) 5-10 MG per tablet Atorvastatin Oral     daily   active (Patient not taking: Reported on 11/7/2022)      cholecalciferol 10 MCG/ML LIQD Take by mouth daily (Patient not taking: Reported on 11/7/2022)      Wilton-Min Oil-Zn Ox-Petrolatum (PREVACARE PERSONAL PROTECT EX)  (Patient not taking: Reported on 12/20/2022)      amoxicillin (AMOXIL) 500 MG capsule  (Patient not taking: Reported on 11/7/2022)      oxyCODONE (ROXICODONE) 5 MG immediate release tablet  (Patient not taking: Reported on 11/7/2022)      atorvastatin (LIPITOR) 10 MG tablet Take 10 mg by mouth daily      vitamin E 400 UNIT capsule Take 400 Units by mouth 2 times daily (Patient not taking: Reported on 11/7/2022)      calcium-vitamin D (OSCAL-500) 500-200 MG-UNIT per tablet Take 1 tablet by mouth daily (Patient not taking: Reported on 11/7/2022)      Multiple Vitamins-Minerals (THERAPEUTIC MULTIVITAMIN-MINERALS) tablet Take 1 tablet by mouth daily (Patient not taking: Reported on 11/7/2022)      ascorbic acid (VITAMIN C) 500 MG tablet Take 500 mg by mouth daily       Current Facility-Administered Medications on File Prior to Encounter   Medication Dose Route Frequency Provider Last Rate Last Admin    furosemide (LASIX) tablet 40 mg  40 mg Oral Once Reagan Herrmann APRN - CNP           Current Meds  acetaminophen (TYLENOL) tablet 650 mg, Q4H PRN  ondansetron (ZOFRAN) injection 4 mg, Q8H PRN  oxyCODONE (ROXICODONE) immediate release tablet 5 mg, Q4H PRN    furosemide (LASIX) tablet 40 mg, Once          ASA 3 - Patient with moderate systemic disease with functional limitations    III (soft palate, base of uvula visible)    Activity:  2 - Able to move 4 extremities voluntarily on command  Respiration:  2 - Able to breathe deeply and cough freely  Circulation:  2 - BP+/- 20mmHg of normal  Consciousness:  2 - Fully awake  Oxygen Saturation (color):  2 - Able to maintain oxygen saturation >92% on room air    Sedation : Moderate sedation planned

## 2022-12-20 NOTE — PROCEDURES
Interventional Radiology Post Procedure    Date: 12/20/2022    Physician: Dave Mace MD    Pre-op Diagnosis: [address]    Post-op Diagnosis: same    Variation from Planned Procedure: None       Findings: Successful delivery of y90 to branches of the right hepatic artery    Patient condition: Stable    Estimated Blood Loss: 10 cc    Specimens:  none      Signed,  Aparna Sanchez MD  10:04 AM  12/20/2022

## 2022-12-20 NOTE — DISCHARGE INSTRUCTIONS
[facility]  Cardiovascular Special Procedures  Yttrium 90 Treatment/Liver Angiogram  Patient Discharge Instructions    Diet- May resume your regular diet after discharge. You should drink at least six 8oz. glasses of water over the next 24 hours. Water helps to clear the dye used during the procedure. Day 1 (Procedure Day):  Rest for the remainder of the day. Minimal stair climbing, if you must use stairs, lead with your unaffected leg. Do not drive a car. Do not be alone. Avoid prolonged sitting, walk around occasionally until bedtime tonight. Leave dressing intact. Day 2:  You may climb stairs, begin slowly  You may drive a car, unless otherwise directed by physician. Remove dressing. You may bathe/shower, but wash gently around the puncture site and pat dry. Place new band-aid on site daily until skin heals. Things to Avoid:  You may not do any strenuous exercises for one week. (ex: golfing, bowling, tennis, vacuum, snow removal, jogging, and aerobic exercises). No hot tubs, baths, or pools for one week. Do not lift anything over 10 pounds for 10 days. Avoid positions that would put pressure on your groin. Like sitting upright in a straight back chair. No lotions, powders, or ointments near site for 5 days. Things to watch for:  You may have a small lump or a bruise. This is common and will go away. If the lump increases and site becomes painful, call physician immediately. If mild discomfort occurs at the puncture site you may place an ice pack on the site at 15 minute intervals. If the puncture site starts to bleed, immediately lie on a hard flat surface and apply pressure just above the puncture site. Have someone call 911 and maintain pressure until the EMS arrives. If you have loss of color, extreme coldness or numbness of the leg, call 911 immediately. Excessive pain of the groin, thigh or calf, call your physician immediately.    Watch for signs and symptoms of an infection at the groin site (fever higher [id number], local pain, redness, warmth or discharge from the puncture site), call your physician immediately if any develop. Any Questions please call us at [phone] (between 7am- 5pm, Mon-Fri). After hours you should contact your physician. Medications: Please resume your medications as prescribed. You should receive a prescription for Prilosec (protect your stomach) and Zofran (nausea) on the day of your Y90 treatment. Follow-Up: You should receive a phone call tomorrow to check on how you are feeling and groin site evaluation. 3 weeks after treatment you will have a set of labs drawn at any [address] [place] [address]. 90 days after treatment date you will have a follow up CTA scan to see how the treatment worked on your liver lesion(s) and a set of labs drawn. CHEMOTHERAPY: If you are or were taking chemotherapy you may resume chemo treatment 2 weeks post treatment. Continue to make appointments with your oncologist and follow with them.

## 2022-12-22 NOTE — ONCOLOGY
Y90 NOTE  Dx HCC- right lobe   he had previously been seen by my partner were goals of treatment as well as alternative therapies and side effects of Y 90 were discussed. I was called to the IR suite by Dr. Nick Ayon. The right hepatic artery  was used to deliver the Y 90. Procedural instructions were followed. Radiation therapy precautions were followed. Follow-up will be arranged by IR. She is also seeing medical oncology.

## 2023-03-15 ENCOUNTER — HOSPITAL ENCOUNTER (OUTPATIENT)
Dept: MRI IMAGING | Age: 83
Discharge: HOME | End: 2023-03-15
Payer: MEDICARE

## 2023-03-15 DIAGNOSIS — C22.0 CARCINOMA OF LIVER (HCC): ICD-10-CM

## 2023-03-15 DIAGNOSIS — C83.35 DIFFUSE LARGE B-CELL LYMPHOMA OF LYMPH NODES OF LOWER EXTREMITY (HCC): ICD-10-CM

## 2023-03-15 PROCEDURE — 74183 MRI ABD W/O CNTR FLWD CNTR: CPT

## 2023-03-15 PROCEDURE — 6360000004 HC RX CONTRAST MEDICATION: Performed by: INTERNAL MEDICINE

## 2023-03-15 PROCEDURE — A9581 GADOXETATE DISODIUM INJ: HCPCS | Performed by: INTERNAL MEDICINE

## 2023-03-15 RX ADMIN — GADOXETATE DISODIUM 8 ML: 181.43 INJECTION, SOLUTION INTRAVENOUS at 12:32

## 2023-08-14 ENCOUNTER — HOSPITAL ENCOUNTER (OUTPATIENT)
Dept: MRI IMAGING | Age: 83
Discharge: HOME | End: 2023-08-14
Attending: INTERNAL MEDICINE
Payer: MEDICARE

## 2023-08-14 PROCEDURE — 74183 MRI ABD W/O CNTR FLWD CNTR: CPT

## 2023-08-14 PROCEDURE — 6360000004 HC RX CONTRAST MEDICATION: Performed by: INTERNAL MEDICINE

## 2023-08-14 PROCEDURE — A9581 GADOXETATE DISODIUM INJ: HCPCS | Performed by: INTERNAL MEDICINE

## 2023-08-14 RX ADMIN — GADOXETATE DISODIUM 8 ML: 181.43 INJECTION, SOLUTION INTRAVENOUS at 18:00

## 2024-01-16 ENCOUNTER — HOSPITAL ENCOUNTER (OUTPATIENT)
Dept: MRI IMAGING | Age: 84
Discharge: HOME | End: 2024-01-16
Attending: RADIOLOGY
Payer: MEDICARE

## 2024-01-16 DIAGNOSIS — C22.0 ERYTHROCYTOSIS DUE TO HEPATOMA (HCC): ICD-10-CM

## 2024-01-16 DIAGNOSIS — D75.1 ERYTHROCYTOSIS DUE TO HEPATOMA (HCC): ICD-10-CM

## 2024-01-16 DIAGNOSIS — C83.35 RETICULOSARCOMA OF LYMPH NODES OF INGUINAL REGION AND LOWER LIMB (HCC): ICD-10-CM

## 2024-01-16 PROCEDURE — 6360000004 HC RX CONTRAST MEDICATION: Performed by: RADIOLOGY

## 2024-01-16 PROCEDURE — 74183 MRI ABD W/O CNTR FLWD CNTR: CPT

## 2024-01-16 PROCEDURE — A9581 GADOXETATE DISODIUM INJ: HCPCS | Performed by: RADIOLOGY

## 2024-01-16 RX ADMIN — GADOXETATE DISODIUM 7 ML: 181.43 INJECTION, SOLUTION INTRAVENOUS at 17:26

## (undated) DEVICE — JEWISH HOSPITAL TURNOVER KIT: Brand: MEDLINE INDUSTRIES, INC.

## (undated) DEVICE — GLOVE SURG SZ 7 L12IN FNGR THK75MIL WHT LTX POLYMER BEAD

## (undated) DEVICE — ADHESIVE SKIN CLSR 0.7ML TOP DERMBND ADV

## (undated) DEVICE — PLATE ES AD W 9FT CRD 2

## (undated) DEVICE — SUTURE VCRL SZ 3-0 L27IN ABSRB UD L26MM SH 1/2 CIR J416H

## (undated) DEVICE — APPLICATOR PREP 26ML 0.7% IOD POVACRYLEX 74% ISO ALC ST

## (undated) DEVICE — BOWL MED L 32OZ PLAS W/ MOLD GRAD EZ OPN PEEL PCH

## (undated) DEVICE — COVER LT HNDL BLU PLAS

## (undated) DEVICE — SHEET,T,THYROID,STERILE: Brand: MEDLINE

## (undated) DEVICE — ELECTROSURGICAL PENCIL ROCKER SWITCH NON COATED BLADE ELECTRODE 10 FT (3 M) CORD HOLSTER: Brand: MEGADYNE

## (undated) DEVICE — MEDICINE CUP, GRADUATED, STER: Brand: MEDLINE

## (undated) DEVICE — STANDARD HYPODERMIC NEEDLE,POLYPROPYLENE HUB: Brand: MONOJECT

## (undated) DEVICE — SURGICAL SET UP - SURE SET: Brand: MEDLINE INDUSTRIES, INC.